# Patient Record
Sex: MALE | Race: WHITE | NOT HISPANIC OR LATINO
[De-identification: names, ages, dates, MRNs, and addresses within clinical notes are randomized per-mention and may not be internally consistent; named-entity substitution may affect disease eponyms.]

---

## 2019-10-16 ENCOUNTER — RESULT REVIEW (OUTPATIENT)
Age: 54
End: 2019-10-16

## 2019-10-16 ENCOUNTER — INPATIENT (INPATIENT)
Facility: HOSPITAL | Age: 54
LOS: 3 days | Discharge: HOME | DRG: 853 | End: 2019-10-20
Attending: SURGERY | Admitting: SURGERY
Payer: COMMERCIAL

## 2019-10-16 VITALS
SYSTOLIC BLOOD PRESSURE: 109 MMHG | OXYGEN SATURATION: 96 % | RESPIRATION RATE: 18 BRPM | HEART RATE: 82 BPM | WEIGHT: 190.04 LBS | DIASTOLIC BLOOD PRESSURE: 71 MMHG | TEMPERATURE: 98 F

## 2019-10-16 LAB
ALBUMIN SERPL ELPH-MCNC: 4.7 G/DL — SIGNIFICANT CHANGE UP (ref 3.3–5)
ALP SERPL-CCNC: 61 U/L — SIGNIFICANT CHANGE UP (ref 40–120)
ALT FLD-CCNC: 26 U/L — SIGNIFICANT CHANGE UP (ref 10–45)
ANION GAP SERPL CALC-SCNC: 19 MMOL/L — HIGH (ref 5–17)
APPEARANCE UR: CLEAR — SIGNIFICANT CHANGE UP
APTT BLD: 21.4 SEC — LOW (ref 27.5–36.3)
AST SERPL-CCNC: 29 U/L — SIGNIFICANT CHANGE UP (ref 10–40)
BASOPHILS # BLD AUTO: 0.03 K/UL — SIGNIFICANT CHANGE UP (ref 0–0.2)
BASOPHILS NFR BLD AUTO: 0.2 % — SIGNIFICANT CHANGE UP (ref 0–2)
BILIRUB SERPL-MCNC: 1.1 MG/DL — SIGNIFICANT CHANGE UP (ref 0.2–1.2)
BILIRUB UR-MCNC: ABNORMAL
BLD GP AB SCN SERPL QL: NEGATIVE — SIGNIFICANT CHANGE UP
BUN SERPL-MCNC: 11 MG/DL — SIGNIFICANT CHANGE UP (ref 7–23)
CALCIUM SERPL-MCNC: 9.9 MG/DL — SIGNIFICANT CHANGE UP (ref 8.4–10.5)
CHLORIDE SERPL-SCNC: 100 MMOL/L — SIGNIFICANT CHANGE UP (ref 96–108)
CO2 SERPL-SCNC: 24 MMOL/L — SIGNIFICANT CHANGE UP (ref 22–31)
COLOR SPEC: YELLOW — SIGNIFICANT CHANGE UP
CREAT SERPL-MCNC: 0.86 MG/DL — SIGNIFICANT CHANGE UP (ref 0.5–1.3)
DIFF PNL FLD: NEGATIVE — SIGNIFICANT CHANGE UP
EOSINOPHIL # BLD AUTO: 0.03 K/UL — SIGNIFICANT CHANGE UP (ref 0–0.5)
EOSINOPHIL NFR BLD AUTO: 0.2 % — SIGNIFICANT CHANGE UP (ref 0–6)
GLUCOSE SERPL-MCNC: 184 MG/DL — HIGH (ref 70–99)
GLUCOSE UR QL: NEGATIVE — SIGNIFICANT CHANGE UP
HCT VFR BLD CALC: 49 % — SIGNIFICANT CHANGE UP (ref 39–50)
HGB BLD-MCNC: 15.8 G/DL — SIGNIFICANT CHANGE UP (ref 13–17)
IMM GRANULOCYTES NFR BLD AUTO: 0.4 % — SIGNIFICANT CHANGE UP (ref 0–1.5)
INR BLD: 1.13 — SIGNIFICANT CHANGE UP (ref 0.88–1.16)
KETONES UR-MCNC: >=80 MG/DL
LEUKOCYTE ESTERASE UR-ACNC: NEGATIVE — SIGNIFICANT CHANGE UP
LIDOCAIN IGE QN: 18 U/L — SIGNIFICANT CHANGE UP (ref 7–60)
LYMPHOCYTES # BLD AUTO: 1.28 K/UL — SIGNIFICANT CHANGE UP (ref 1–3.3)
LYMPHOCYTES # BLD AUTO: 9.4 % — LOW (ref 13–44)
MCHC RBC-ENTMCNC: 28.5 PG — SIGNIFICANT CHANGE UP (ref 27–34)
MCHC RBC-ENTMCNC: 32.2 GM/DL — SIGNIFICANT CHANGE UP (ref 32–36)
MCV RBC AUTO: 88.3 FL — SIGNIFICANT CHANGE UP (ref 80–100)
MONOCYTES # BLD AUTO: 0.78 K/UL — SIGNIFICANT CHANGE UP (ref 0–0.9)
MONOCYTES NFR BLD AUTO: 5.7 % — SIGNIFICANT CHANGE UP (ref 2–14)
NEUTROPHILS # BLD AUTO: 11.45 K/UL — HIGH (ref 1.8–7.4)
NEUTROPHILS NFR BLD AUTO: 84.1 % — HIGH (ref 43–77)
NITRITE UR-MCNC: NEGATIVE — SIGNIFICANT CHANGE UP
NRBC # BLD: 0 /100 WBCS — SIGNIFICANT CHANGE UP (ref 0–0)
PH UR: 6 — SIGNIFICANT CHANGE UP (ref 5–8)
PLATELET # BLD AUTO: 304 K/UL — SIGNIFICANT CHANGE UP (ref 150–400)
POTASSIUM SERPL-MCNC: 4.6 MMOL/L — SIGNIFICANT CHANGE UP (ref 3.5–5.3)
POTASSIUM SERPL-SCNC: 4.6 MMOL/L — SIGNIFICANT CHANGE UP (ref 3.5–5.3)
PROT SERPL-MCNC: 8.5 G/DL — HIGH (ref 6–8.3)
PROT UR-MCNC: NEGATIVE MG/DL — SIGNIFICANT CHANGE UP
PROTHROM AB SERPL-ACNC: 12.8 SEC — SIGNIFICANT CHANGE UP (ref 10–12.9)
RBC # BLD: 5.55 M/UL — SIGNIFICANT CHANGE UP (ref 4.2–5.8)
RBC # FLD: 13.1 % — SIGNIFICANT CHANGE UP (ref 10.3–14.5)
RH IG SCN BLD-IMP: POSITIVE — SIGNIFICANT CHANGE UP
RH IG SCN BLD-IMP: POSITIVE — SIGNIFICANT CHANGE UP
SODIUM SERPL-SCNC: 143 MMOL/L — SIGNIFICANT CHANGE UP (ref 135–145)
SP GR SPEC: >=1.03 — SIGNIFICANT CHANGE UP (ref 1–1.03)
UROBILINOGEN FLD QL: 0.2 E.U./DL — SIGNIFICANT CHANGE UP
WBC # BLD: 13.62 K/UL — HIGH (ref 3.8–10.5)
WBC # FLD AUTO: 13.62 K/UL — HIGH (ref 3.8–10.5)

## 2019-10-16 PROCEDURE — 44970 LAPAROSCOPY APPENDECTOMY: CPT

## 2019-10-16 PROCEDURE — 99285 EMERGENCY DEPT VISIT HI MDM: CPT

## 2019-10-16 PROCEDURE — 71045 X-RAY EXAM CHEST 1 VIEW: CPT | Mod: 26

## 2019-10-16 PROCEDURE — 74177 CT ABD & PELVIS W/CONTRAST: CPT | Mod: 26

## 2019-10-16 PROCEDURE — 99232 SBSQ HOSP IP/OBS MODERATE 35: CPT | Mod: 57

## 2019-10-16 RX ORDER — CEFTRIAXONE 500 MG/1
1000 INJECTION, POWDER, FOR SOLUTION INTRAMUSCULAR; INTRAVENOUS ONCE
Refills: 0 | Status: COMPLETED | OUTPATIENT
Start: 2019-10-16 | End: 2019-10-16

## 2019-10-16 RX ORDER — CEFTRIAXONE 500 MG/1
1000 INJECTION, POWDER, FOR SOLUTION INTRAMUSCULAR; INTRAVENOUS EVERY 24 HOURS
Refills: 0 | Status: CANCELLED | OUTPATIENT
Start: 2019-10-17 | End: 2019-10-17

## 2019-10-16 RX ORDER — ONDANSETRON 8 MG/1
4 TABLET, FILM COATED ORAL EVERY 6 HOURS
Refills: 0 | Status: DISCONTINUED | OUTPATIENT
Start: 2019-10-16 | End: 2019-10-17

## 2019-10-16 RX ORDER — SODIUM CHLORIDE 9 MG/ML
1000 INJECTION INTRAMUSCULAR; INTRAVENOUS; SUBCUTANEOUS
Refills: 0 | Status: DISCONTINUED | OUTPATIENT
Start: 2019-10-16 | End: 2019-10-17

## 2019-10-16 RX ORDER — MORPHINE SULFATE 50 MG/1
4 CAPSULE, EXTENDED RELEASE ORAL ONCE
Refills: 0 | Status: DISCONTINUED | OUTPATIENT
Start: 2019-10-16 | End: 2019-10-16

## 2019-10-16 RX ORDER — METRONIDAZOLE 500 MG
500 TABLET ORAL ONCE
Refills: 0 | Status: COMPLETED | OUTPATIENT
Start: 2019-10-16 | End: 2019-10-16

## 2019-10-16 RX ORDER — HYDROMORPHONE HYDROCHLORIDE 2 MG/ML
0.5 INJECTION INTRAMUSCULAR; INTRAVENOUS; SUBCUTANEOUS EVERY 6 HOURS
Refills: 0 | Status: DISCONTINUED | OUTPATIENT
Start: 2019-10-16 | End: 2019-10-17

## 2019-10-16 RX ORDER — ACETAMINOPHEN 500 MG
975 TABLET ORAL ONCE
Refills: 0 | Status: COMPLETED | OUTPATIENT
Start: 2019-10-16 | End: 2019-10-16

## 2019-10-16 RX ORDER — HYDROMORPHONE HYDROCHLORIDE 2 MG/ML
0.5 INJECTION INTRAMUSCULAR; INTRAVENOUS; SUBCUTANEOUS ONCE
Refills: 0 | Status: DISCONTINUED | OUTPATIENT
Start: 2019-10-16 | End: 2019-10-16

## 2019-10-16 RX ORDER — BUPIVACAINE 13.3 MG/ML
20 INJECTION, SUSPENSION, LIPOSOMAL INFILTRATION ONCE
Refills: 0 | Status: DISCONTINUED | OUTPATIENT
Start: 2019-10-16 | End: 2019-10-17

## 2019-10-16 RX ORDER — METRONIDAZOLE 500 MG
500 TABLET ORAL EVERY 8 HOURS
Refills: 0 | Status: CANCELLED | OUTPATIENT
Start: 2019-10-17 | End: 2019-10-17

## 2019-10-16 RX ORDER — SODIUM CHLORIDE 9 MG/ML
1000 INJECTION INTRAMUSCULAR; INTRAVENOUS; SUBCUTANEOUS ONCE
Refills: 0 | Status: COMPLETED | OUTPATIENT
Start: 2019-10-16 | End: 2019-10-16

## 2019-10-16 RX ORDER — IOHEXOL 300 MG/ML
30 INJECTION, SOLUTION INTRAVENOUS ONCE
Refills: 0 | Status: COMPLETED | OUTPATIENT
Start: 2019-10-16 | End: 2019-10-16

## 2019-10-16 RX ORDER — HYDROMORPHONE HYDROCHLORIDE 2 MG/ML
1 INJECTION INTRAMUSCULAR; INTRAVENOUS; SUBCUTANEOUS EVERY 6 HOURS
Refills: 0 | Status: DISCONTINUED | OUTPATIENT
Start: 2019-10-16 | End: 2019-10-17

## 2019-10-16 RX ORDER — HEPARIN SODIUM 5000 [USP'U]/ML
5000 INJECTION INTRAVENOUS; SUBCUTANEOUS EVERY 8 HOURS
Refills: 0 | Status: DISCONTINUED | OUTPATIENT
Start: 2019-10-16 | End: 2019-10-17

## 2019-10-16 RX ORDER — ONDANSETRON 8 MG/1
4 TABLET, FILM COATED ORAL ONCE
Refills: 0 | Status: COMPLETED | OUTPATIENT
Start: 2019-10-16 | End: 2019-10-16

## 2019-10-16 RX ADMIN — MORPHINE SULFATE 4 MILLIGRAM(S): 50 CAPSULE, EXTENDED RELEASE ORAL at 17:01

## 2019-10-16 RX ADMIN — SODIUM CHLORIDE 1000 MILLILITER(S): 9 INJECTION INTRAMUSCULAR; INTRAVENOUS; SUBCUTANEOUS at 16:15

## 2019-10-16 RX ADMIN — Medication 975 MILLIGRAM(S): at 19:44

## 2019-10-16 RX ADMIN — SODIUM CHLORIDE 1000 MILLILITER(S): 9 INJECTION INTRAMUSCULAR; INTRAVENOUS; SUBCUTANEOUS at 00:00

## 2019-10-16 RX ADMIN — CEFTRIAXONE 100 MILLIGRAM(S): 500 INJECTION, POWDER, FOR SOLUTION INTRAMUSCULAR; INTRAVENOUS at 19:11

## 2019-10-16 RX ADMIN — HYDROMORPHONE HYDROCHLORIDE 0.5 MILLIGRAM(S): 2 INJECTION INTRAMUSCULAR; INTRAVENOUS; SUBCUTANEOUS at 17:12

## 2019-10-16 RX ADMIN — MORPHINE SULFATE 4 MILLIGRAM(S): 50 CAPSULE, EXTENDED RELEASE ORAL at 16:07

## 2019-10-16 RX ADMIN — HYDROMORPHONE HYDROCHLORIDE 0.5 MILLIGRAM(S): 2 INJECTION INTRAMUSCULAR; INTRAVENOUS; SUBCUTANEOUS at 18:44

## 2019-10-16 RX ADMIN — IOHEXOL 30 MILLILITER(S): 300 INJECTION, SOLUTION INTRAVENOUS at 16:15

## 2019-10-16 RX ADMIN — Medication 975 MILLIGRAM(S): at 18:41

## 2019-10-16 RX ADMIN — Medication 100 MILLIGRAM(S): at 19:44

## 2019-10-16 RX ADMIN — SODIUM CHLORIDE 1000 MILLILITER(S): 9 INJECTION INTRAMUSCULAR; INTRAVENOUS; SUBCUTANEOUS at 19:10

## 2019-10-16 RX ADMIN — ONDANSETRON 4 MILLIGRAM(S): 8 TABLET, FILM COATED ORAL at 16:00

## 2019-10-16 NOTE — ED ADULT NURSE NOTE - OBJECTIVE STATEMENT
pt c/o severe abdominal pain with nausea and vomiting x1 that started today. Pt describes pain as cramping

## 2019-10-16 NOTE — H&P ADULT - HISTORY OF PRESENT ILLNESS
53M no PMH/PSH presents with 12 hours of RLQ pain. The pain is sharp and constant. He has had nausea with 1 episode of NB/NB emesis. He has had fevers and chills. He is passing flatus and brown bowel movements today. He denies any prior episodes, sick contacts, recent travel, colonoscopy, EGD, or prior abdominal surgeries.

## 2019-10-16 NOTE — ED ADULT NURSE NOTE - NSIMPLEMENTINTERV_GEN_ALL_ED
Implemented All Universal Safety Interventions:  [place] to call system. Call bell, personal items and telephone within reach. Instruct patient to call for assistance. Room bathroom lighting operational. Non-slip footwear when patient is off stretcher. Physically safe environment: no spills, clutter or unnecessary equipment. Stretcher in lowest position, wheels locked, appropriate side rails in place.

## 2019-10-16 NOTE — H&P ADULT - ATTENDING COMMENTS
Patient seen and examined. His history, physical examination and imaging are consistent with acute appendicitis. We discussed treatment options. We discussed the risks, benefits and alternatives to laparoscopic appendectomy with the patient including but not limited to bleeding, infection, death, disability, chronic pain, numbness, abscess, leak, dvt, cardiac and pulmonary issues, need for additional procedures, and other issues. The patient does wish to proceed with surgery. I answered all questions.

## 2019-10-16 NOTE — H&P ADULT - NSHPLABSRESULTS_GEN_ALL_CORE
Vital Signs:  Vital Signs Last 24 Hrs  T(C): 38.1 (16 Oct 2019 19:00), Max: 38.1 (16 Oct 2019 17:25)  T(F): 100.6 (16 Oct 2019 19:00), Max: 100.6 (16 Oct 2019 19:00)  HR: 94 (16 Oct 2019 19:00) (82 - 97)  BP: 169/82 (16 Oct 2019 19:00) (109/71 - 169/82)  BP(mean): --  RR: 18 (16 Oct 2019 19:00) (18 - 18)  SpO2: 95% (16 Oct 2019 19:00) (95% - 96%)        Labs:                        15.8   13.62 )-----------( 304      ( 16 Oct 2019 15:47 )             49.0     10-16    143  |  100  |  11  ----------------------------<  184<H>  4.6   |  24  |  0.86    Ca    9.9      16 Oct 2019 15:47    TPro  8.5<H>  /  Alb  4.7  /  TBili  1.1  /  DBili  x   /  AST  29  /  ALT  26  /  AlkPhos  61  10-16    PT/INR - ( 16 Oct 2019 19:08 )   PT: 12.8 sec;   INR: 1.13          PTT - ( 16 Oct 2019 19:08 )  PTT:21.4 sec  Urinalysis Basic - ( 16 Oct 2019 17:48 )    Color: Yellow / Appearance: Clear / SG: >=1.030 / pH: x  Gluc: x / Ketone: >=80 mg/dL  / Bili: Moderate / Urobili: 0.2 E.U./dL   Blood: x / Protein: NEGATIVE mg/dL / Nitrite: NEGATIVE   Leuk Esterase: NEGATIVE / RBC: x / WBC x   Sq Epi: x / Non Sq Epi: x / Bacteria: x      CAPILLARY BLOOD GLUCOSE        LIVER FUNCTIONS - ( 16 Oct 2019 15:47 )  Alb: 4.7 g/dL / Pro: 8.5 g/dL / ALK PHOS: 61 U/L / ALT: 26 U/L / AST: 29 U/L / GGT: x    CT A/P W/ PO AND IV CONTRAST  Lower chest: Trace right pleural effusion with passive atelectasis in the   right lower lobe.     Liver: Subcentimeter hypodensity in the hepatic dome, too small to   characterize..     Biliary system: Normal.     Pancreas: Normal.    Spleen: Normal.    Adrenal glands: Normal    Kidneys: Symmetric renal enhancement. Subcentimeter hypodensity in the   right upper pole, too small to characterize. No hydronephrosis..     Bowel/Peritoneum: Dilated appendix with peripheral fat stranding and   linear nondrainable fluid.. Suggestion of a 1.5 x 1.5 cm fluid collection   with thick enhancing rim near the tip suspicious for an appendiceal   abscess. Small perihepatic and pelvic ascites. No free air. Oral contrast   in distal esophagus suggests gastroesophageal reflux. Small   supraumbilical hernia containing fat and a blood vessel.    Pelvic organs: Enlarged prostate. The bladder appears normal.    Lymph nodes: Normal.    Vascular: Normal.    Bones/Soft tissues: Degenerative changes of the spine and hips. Tiny   fat-containing right inguinal hernia.      IMPRESSION:   1.  Acute appendicitis. Possible 1.5 cm appendiceal abscess near the tip.   No free air. Small abdominal and pelvic ascites.    2.  Small supraumbilical hernia, as above.    3.  Enlarged prostate

## 2019-10-16 NOTE — ED PROVIDER NOTE - OBJECTIVE STATEMENT
52 y/o m no pmh presents c/o right side abd pain which began this morning, gradually worsening throughout the day.  Pt reports nausea, vomited once since arriving to ED.  Denies fever, chills, diarrhea, dysuria, all other ROS negative.

## 2019-10-16 NOTE — ED PROVIDER NOTE - ATTENDING CONTRIBUTION TO CARE
54 yo with no medical problems, rt sided abd pain w nausea, tender to RLQ, no diarrhea, fever in ER  highest suspician for ramsey, ERI neg,   pending CT study

## 2019-10-16 NOTE — H&P ADULT - ASSESSMENT
A/P: 53M no PMH/PSH presents with acute appendicitis.    - Admit to regional bed under Dr. Mclaughlin  - Pain/nausea PRN  - NPO/IVF  - Ceftriaxone/Flagyl  - OOBA/SCDs/SQH  - Preoperative labs, EKG, CXR  - Add on appendectomy  - Case discussed with chief resident and Dr. Mclaughlin

## 2019-10-16 NOTE — H&P ADULT - NSHPPHYSICALEXAM_GEN_ALL_CORE
Physical Exam:  General: NAD  Pulmonary: Nonlabored breathing, no respiratory distress  Abdominal: Soft, RLQ tenderness to light palpation, no rebound tenderness, slightly distended  Neuro: AAO x3, no focal deficits

## 2019-10-16 NOTE — ED PROVIDER NOTE - CLINICAL SUMMARY MEDICAL DECISION MAKING FREE TEXT BOX
54 y/o m no pmh presents c/o RLQ pain today; initially pt afebrile, temp on repeat is 100.5, tender on exam concerning for appendicitis.  WBC 13, urine neg, given zofran, pain meds, IV fluid.  Will f/u CT

## 2019-10-17 DIAGNOSIS — Z98.890 OTHER SPECIFIED POSTPROCEDURAL STATES: ICD-10-CM

## 2019-10-17 DIAGNOSIS — K35.80 UNSPECIFIED ACUTE APPENDICITIS: ICD-10-CM

## 2019-10-17 LAB
ANION GAP SERPL CALC-SCNC: 10 MMOL/L — SIGNIFICANT CHANGE UP (ref 5–17)
BUN SERPL-MCNC: 12 MG/DL — SIGNIFICANT CHANGE UP (ref 7–23)
CALCIUM SERPL-MCNC: 8.6 MG/DL — SIGNIFICANT CHANGE UP (ref 8.4–10.5)
CHLORIDE SERPL-SCNC: 103 MMOL/L — SIGNIFICANT CHANGE UP (ref 96–108)
CO2 SERPL-SCNC: 22 MMOL/L — SIGNIFICANT CHANGE UP (ref 22–31)
CREAT SERPL-MCNC: 0.76 MG/DL — SIGNIFICANT CHANGE UP (ref 0.5–1.3)
GLUCOSE SERPL-MCNC: 199 MG/DL — HIGH (ref 70–99)
HCT VFR BLD CALC: 41.7 % — SIGNIFICANT CHANGE UP (ref 39–50)
HGB BLD-MCNC: 13.2 G/DL — SIGNIFICANT CHANGE UP (ref 13–17)
MAGNESIUM SERPL-MCNC: 1.7 MG/DL — SIGNIFICANT CHANGE UP (ref 1.6–2.6)
MCHC RBC-ENTMCNC: 28.7 PG — SIGNIFICANT CHANGE UP (ref 27–34)
MCHC RBC-ENTMCNC: 31.7 GM/DL — LOW (ref 32–36)
MCV RBC AUTO: 90.7 FL — SIGNIFICANT CHANGE UP (ref 80–100)
NRBC # BLD: 0 /100 WBCS — SIGNIFICANT CHANGE UP (ref 0–0)
PHOSPHATE SERPL-MCNC: 2.1 MG/DL — LOW (ref 2.5–4.5)
PLATELET # BLD AUTO: 240 K/UL — SIGNIFICANT CHANGE UP (ref 150–400)
POTASSIUM SERPL-MCNC: 4.5 MMOL/L — SIGNIFICANT CHANGE UP (ref 3.5–5.3)
POTASSIUM SERPL-SCNC: 4.5 MMOL/L — SIGNIFICANT CHANGE UP (ref 3.5–5.3)
RBC # BLD: 4.6 M/UL — SIGNIFICANT CHANGE UP (ref 4.2–5.8)
RBC # FLD: 13.1 % — SIGNIFICANT CHANGE UP (ref 10.3–14.5)
SODIUM SERPL-SCNC: 135 MMOL/L — SIGNIFICANT CHANGE UP (ref 135–145)
WBC # BLD: 15.76 K/UL — HIGH (ref 3.8–10.5)
WBC # FLD AUTO: 15.76 K/UL — HIGH (ref 3.8–10.5)

## 2019-10-17 PROCEDURE — 99222 1ST HOSP IP/OBS MODERATE 55: CPT | Mod: GC

## 2019-10-17 RX ORDER — HYDROMORPHONE HYDROCHLORIDE 2 MG/ML
0.5 INJECTION INTRAMUSCULAR; INTRAVENOUS; SUBCUTANEOUS EVERY 4 HOURS
Refills: 0 | Status: DISCONTINUED | OUTPATIENT
Start: 2019-10-17 | End: 2019-10-18

## 2019-10-17 RX ORDER — PIPERACILLIN AND TAZOBACTAM 4; .5 G/20ML; G/20ML
3.38 INJECTION, POWDER, LYOPHILIZED, FOR SOLUTION INTRAVENOUS EVERY 6 HOURS
Refills: 0 | Status: DISCONTINUED | OUTPATIENT
Start: 2019-10-17 | End: 2019-10-20

## 2019-10-17 RX ORDER — SODIUM CHLORIDE 9 MG/ML
1000 INJECTION, SOLUTION INTRAVENOUS
Refills: 0 | Status: DISCONTINUED | OUTPATIENT
Start: 2019-10-17 | End: 2019-10-17

## 2019-10-17 RX ORDER — SODIUM CHLORIDE 9 MG/ML
1000 INJECTION, SOLUTION INTRAVENOUS
Refills: 0 | Status: DISCONTINUED | OUTPATIENT
Start: 2019-10-17 | End: 2019-10-18

## 2019-10-17 RX ORDER — HEPARIN SODIUM 5000 [USP'U]/ML
5000 INJECTION INTRAVENOUS; SUBCUTANEOUS EVERY 8 HOURS
Refills: 0 | Status: DISCONTINUED | OUTPATIENT
Start: 2019-10-17 | End: 2019-10-20

## 2019-10-17 RX ORDER — MAGNESIUM SULFATE 500 MG/ML
1 VIAL (ML) INJECTION ONCE
Refills: 0 | Status: COMPLETED | OUTPATIENT
Start: 2019-10-17 | End: 2019-10-17

## 2019-10-17 RX ORDER — ONDANSETRON 8 MG/1
4 TABLET, FILM COATED ORAL EVERY 6 HOURS
Refills: 0 | Status: DISCONTINUED | OUTPATIENT
Start: 2019-10-17 | End: 2019-10-20

## 2019-10-17 RX ORDER — HYDRALAZINE HCL 50 MG
10 TABLET ORAL ONCE
Refills: 0 | Status: COMPLETED | OUTPATIENT
Start: 2019-10-17 | End: 2019-10-17

## 2019-10-17 RX ORDER — INFLUENZA VIRUS VACCINE 15; 15; 15; 15 UG/.5ML; UG/.5ML; UG/.5ML; UG/.5ML
0.5 SUSPENSION INTRAMUSCULAR ONCE
Refills: 0 | Status: DISCONTINUED | OUTPATIENT
Start: 2019-10-17 | End: 2019-10-20

## 2019-10-17 RX ORDER — HYDROMORPHONE HYDROCHLORIDE 2 MG/ML
1 INJECTION INTRAMUSCULAR; INTRAVENOUS; SUBCUTANEOUS EVERY 4 HOURS
Refills: 0 | Status: DISCONTINUED | OUTPATIENT
Start: 2019-10-17 | End: 2019-10-18

## 2019-10-17 RX ADMIN — Medication 1 MILLIGRAM(S): at 21:21

## 2019-10-17 RX ADMIN — Medication 100 GRAM(S): at 14:45

## 2019-10-17 RX ADMIN — PIPERACILLIN AND TAZOBACTAM 200 GRAM(S): 4; .5 INJECTION, POWDER, LYOPHILIZED, FOR SOLUTION INTRAVENOUS at 07:27

## 2019-10-17 RX ADMIN — SODIUM CHLORIDE 110 MILLILITER(S): 9 INJECTION, SOLUTION INTRAVENOUS at 12:40

## 2019-10-17 RX ADMIN — HEPARIN SODIUM 5000 UNIT(S): 5000 INJECTION INTRAVENOUS; SUBCUTANEOUS at 17:44

## 2019-10-17 RX ADMIN — PIPERACILLIN AND TAZOBACTAM 200 GRAM(S): 4; .5 INJECTION, POWDER, LYOPHILIZED, FOR SOLUTION INTRAVENOUS at 17:44

## 2019-10-17 RX ADMIN — SODIUM CHLORIDE 125 MILLILITER(S): 9 INJECTION, SOLUTION INTRAVENOUS at 03:00

## 2019-10-17 RX ADMIN — HEPARIN SODIUM 5000 UNIT(S): 5000 INJECTION INTRAVENOUS; SUBCUTANEOUS at 09:42

## 2019-10-17 RX ADMIN — PIPERACILLIN AND TAZOBACTAM 200 GRAM(S): 4; .5 INJECTION, POWDER, LYOPHILIZED, FOR SOLUTION INTRAVENOUS at 23:00

## 2019-10-17 RX ADMIN — Medication 10 MILLIGRAM(S): at 22:59

## 2019-10-17 RX ADMIN — Medication 62.5 MILLIMOLE(S): at 14:45

## 2019-10-17 RX ADMIN — PIPERACILLIN AND TAZOBACTAM 200 GRAM(S): 4; .5 INJECTION, POWDER, LYOPHILIZED, FOR SOLUTION INTRAVENOUS at 02:14

## 2019-10-17 NOTE — CONSULT NOTE ADULT - SUBJECTIVE AND OBJECTIVE BOX
INTERVAL HPI/OVERNIGHT EVENTS:  Interim reviewed and patient examined;  Post op appendectomy. No other significant medical problems; No Cardio or pulmonary  disease;       MEDICATIONS  (STANDING):  heparin  Injectable 5000 Unit(s) SubCutaneous every 8 hours  influenza   Vaccine 0.5 milliLiter(s) IntraMuscular once  piperacillin/tazobactam IVPB.. 3.375 Gram(s) IV Intermittent every 6 hours  sodium chloride 0.9% 1000 milliLiter(s) (125 mL/Hr) IV Continuous <Continuous>    MEDICATIONS  (PRN):  HYDROmorphone  Injectable 0.5 milliGRAM(s) IV Push every 4 hours PRN Moderate Pain (4 - 6)  HYDROmorphone  Injectable 1 milliGRAM(s) IV Push every 4 hours PRN Severe Pain (7 - 10)  ondansetron Injectable 4 milliGRAM(s) IV Push every 6 hours PRN Nausea      Allergies    No Known Allergies    Intolerances        Vital Signs Last 24 Hrs  T(C): 36.8 (17 Oct 2019 10:00), Max: 38.3 (16 Oct 2019 21:09)  T(F): 98.3 (17 Oct 2019 10:00), Max: 101 (16 Oct 2019 21:09)  HR: 78 (17 Oct 2019 08:37) (18 - 97)  BP: 127/84 (17 Oct 2019 08:37) (109/71 - 169/82)  BP(mean): 101 (17 Oct 2019 08:37) (78 - 101)  RR: 19 (17 Oct 2019 08:37) (15 - 30)  SpO2: 98% (17 Oct 2019 08:37) (95% - 98%)          Constitutional:  Awake    Eyes: LOUISE    ENMT: Negative    Neck: Supple    Back:  no tenderness     Respiratory:  clear    Cardiovascular: S1 S2    Gastrointestinal: soft slight distension   HARITHA inplace    Genitourinary:    Extremities:  no edema    Vascular:    Neurological:    Skin:    Lymph Nodes:            10-16 @ 07:01  -  10-17 @ 07:00  --------------------------------------------------------  IN: 975 mL / OUT: 390 mL / NET: 585 mL      LABS:                        15.8   13.62 )-----------( 304      ( 16 Oct 2019 15:47 )             49.0     10-16    143  |  100  |  11  ----------------------------<  184<H>  4.6   |  24  |  0.86    Ca    9.9      16 Oct 2019 15:47    TPro  8.5<H>  /  Alb  4.7  /  TBili  1.1  /  DBili  x   /  AST  29  /  ALT  26  /  AlkPhos  61  10-16    PT/INR - ( 16 Oct 2019 19:08 )   PT: 12.8 sec;   INR: 1.13          PTT - ( 16 Oct 2019 19:08 )  PTT:21.4 sec  Urinalysis Basic - ( 16 Oct 2019 17:48 )    Color: Yellow / Appearance: Clear / SG: >=1.030 / pH: x  Gluc: x / Ketone: >=80 mg/dL  / Bili: Moderate / Urobili: 0.2 E.U./dL   Blood: x / Protein: NEGATIVE mg/dL / Nitrite: NEGATIVE   Leuk Esterase: NEGATIVE / RBC: x / WBC x   Sq Epi: x / Non Sq Epi: x / Bacteria: x        RADIOLOGY & ADDITIONAL TESTS:

## 2019-10-17 NOTE — PROGRESS NOTE ADULT - ASSESSMENT
52 yo alcoholic POD 0 s/p lap appy and washout of gangrenous and perforated appendix  Will consider advancing diet  Continue Abx  CIWA for alcohol withdrawl

## 2019-10-17 NOTE — BRIEF OPERATIVE NOTE - OPERATION/FINDINGS
Preop Dx: Acute Appendicitis  Postop Dx: Acute perforated appendicitis  Procedure: Laparoscopic Appendectomy  Findings: Abdomen accessed via open cutdown. Generalized peritonitis noted. Pus seen in pelvis and along right paracolic gutter. Retrocecal gangrenous, perforated appendix with associated abscess cavity noted. Mesoappendix taken with harmonic scalpel. Appendix taken with 45mm purple load EndoGIA stapler. Staple line hemostatic. Pelvis, Right paracolic gutter, and perihepatic spaces irrigated thoroughly. 19Fr Drain left in right paracolic gutter and parahepatic space.

## 2019-10-18 DIAGNOSIS — F41.9 ANXIETY DISORDER, UNSPECIFIED: ICD-10-CM

## 2019-10-18 LAB
ANION GAP SERPL CALC-SCNC: 10 MMOL/L — SIGNIFICANT CHANGE UP (ref 5–17)
BUN SERPL-MCNC: 14 MG/DL — SIGNIFICANT CHANGE UP (ref 7–23)
CALCIUM SERPL-MCNC: 8.7 MG/DL — SIGNIFICANT CHANGE UP (ref 8.4–10.5)
CHLORIDE SERPL-SCNC: 99 MMOL/L — SIGNIFICANT CHANGE UP (ref 96–108)
CO2 SERPL-SCNC: 25 MMOL/L — SIGNIFICANT CHANGE UP (ref 22–31)
CREAT SERPL-MCNC: 0.86 MG/DL — SIGNIFICANT CHANGE UP (ref 0.5–1.3)
GLUCOSE SERPL-MCNC: 128 MG/DL — HIGH (ref 70–99)
HCT VFR BLD CALC: 44.6 % — SIGNIFICANT CHANGE UP (ref 39–50)
HGB BLD-MCNC: 14.1 G/DL — SIGNIFICANT CHANGE UP (ref 13–17)
MAGNESIUM SERPL-MCNC: 2 MG/DL — SIGNIFICANT CHANGE UP (ref 1.6–2.6)
MCHC RBC-ENTMCNC: 27.9 PG — SIGNIFICANT CHANGE UP (ref 27–34)
MCHC RBC-ENTMCNC: 31.6 GM/DL — LOW (ref 32–36)
MCV RBC AUTO: 88.3 FL — SIGNIFICANT CHANGE UP (ref 80–100)
NRBC # BLD: 0 /100 WBCS — SIGNIFICANT CHANGE UP (ref 0–0)
PHOSPHATE SERPL-MCNC: 2.5 MG/DL — SIGNIFICANT CHANGE UP (ref 2.5–4.5)
PLATELET # BLD AUTO: 259 K/UL — SIGNIFICANT CHANGE UP (ref 150–400)
POTASSIUM SERPL-MCNC: 3.9 MMOL/L — SIGNIFICANT CHANGE UP (ref 3.5–5.3)
POTASSIUM SERPL-SCNC: 3.9 MMOL/L — SIGNIFICANT CHANGE UP (ref 3.5–5.3)
RBC # BLD: 5.05 M/UL — SIGNIFICANT CHANGE UP (ref 4.2–5.8)
RBC # FLD: 12.9 % — SIGNIFICANT CHANGE UP (ref 10.3–14.5)
SODIUM SERPL-SCNC: 134 MMOL/L — LOW (ref 135–145)
WBC # BLD: 13.88 K/UL — HIGH (ref 3.8–10.5)
WBC # FLD AUTO: 13.88 K/UL — HIGH (ref 3.8–10.5)

## 2019-10-18 PROCEDURE — 99233 SBSQ HOSP IP/OBS HIGH 50: CPT | Mod: GC

## 2019-10-18 RX ORDER — HYDROMORPHONE HYDROCHLORIDE 2 MG/ML
0.3 INJECTION INTRAMUSCULAR; INTRAVENOUS; SUBCUTANEOUS EVERY 4 HOURS
Refills: 0 | Status: DISCONTINUED | OUTPATIENT
Start: 2019-10-18 | End: 2019-10-20

## 2019-10-18 RX ORDER — LANOLIN ALCOHOL/MO/W.PET/CERES
5 CREAM (GRAM) TOPICAL AT BEDTIME
Refills: 0 | Status: DISCONTINUED | OUTPATIENT
Start: 2019-10-18 | End: 2019-10-20

## 2019-10-18 RX ORDER — ACETAMINOPHEN 500 MG
1000 TABLET ORAL ONCE
Refills: 0 | Status: COMPLETED | OUTPATIENT
Start: 2019-10-18 | End: 2019-10-18

## 2019-10-18 RX ORDER — DEXTROSE MONOHYDRATE, SODIUM CHLORIDE, AND POTASSIUM CHLORIDE 50; .745; 4.5 G/1000ML; G/1000ML; G/1000ML
1000 INJECTION, SOLUTION INTRAVENOUS
Refills: 0 | Status: DISCONTINUED | OUTPATIENT
Start: 2019-10-18 | End: 2019-10-20

## 2019-10-18 RX ORDER — POTASSIUM CHLORIDE 20 MEQ
20 PACKET (EA) ORAL ONCE
Refills: 0 | Status: COMPLETED | OUTPATIENT
Start: 2019-10-18 | End: 2019-10-18

## 2019-10-18 RX ORDER — HYDROMORPHONE HYDROCHLORIDE 2 MG/ML
0.5 INJECTION INTRAMUSCULAR; INTRAVENOUS; SUBCUTANEOUS EVERY 4 HOURS
Refills: 0 | Status: DISCONTINUED | OUTPATIENT
Start: 2019-10-18 | End: 2019-10-20

## 2019-10-18 RX ADMIN — Medication 20 MILLIEQUIVALENT(S): at 15:35

## 2019-10-18 RX ADMIN — Medication 5 MILLIGRAM(S): at 00:45

## 2019-10-18 RX ADMIN — Medication 400 MILLIGRAM(S): at 03:02

## 2019-10-18 RX ADMIN — SODIUM CHLORIDE 110 MILLILITER(S): 9 INJECTION, SOLUTION INTRAVENOUS at 00:04

## 2019-10-18 RX ADMIN — Medication 5 MILLIGRAM(S): at 23:40

## 2019-10-18 RX ADMIN — HEPARIN SODIUM 5000 UNIT(S): 5000 INJECTION INTRAVENOUS; SUBCUTANEOUS at 09:48

## 2019-10-18 RX ADMIN — PIPERACILLIN AND TAZOBACTAM 200 GRAM(S): 4; .5 INJECTION, POWDER, LYOPHILIZED, FOR SOLUTION INTRAVENOUS at 09:43

## 2019-10-18 RX ADMIN — HEPARIN SODIUM 5000 UNIT(S): 5000 INJECTION INTRAVENOUS; SUBCUTANEOUS at 22:31

## 2019-10-18 RX ADMIN — Medication 1000 MILLIGRAM(S): at 05:24

## 2019-10-18 RX ADMIN — PIPERACILLIN AND TAZOBACTAM 200 GRAM(S): 4; .5 INJECTION, POWDER, LYOPHILIZED, FOR SOLUTION INTRAVENOUS at 04:12

## 2019-10-18 RX ADMIN — HEPARIN SODIUM 5000 UNIT(S): 5000 INJECTION INTRAVENOUS; SUBCUTANEOUS at 01:14

## 2019-10-18 RX ADMIN — PIPERACILLIN AND TAZOBACTAM 200 GRAM(S): 4; .5 INJECTION, POWDER, LYOPHILIZED, FOR SOLUTION INTRAVENOUS at 16:30

## 2019-10-18 RX ADMIN — DEXTROSE MONOHYDRATE, SODIUM CHLORIDE, AND POTASSIUM CHLORIDE 100 MILLILITER(S): 50; .745; 4.5 INJECTION, SOLUTION INTRAVENOUS at 22:31

## 2019-10-18 RX ADMIN — PIPERACILLIN AND TAZOBACTAM 200 GRAM(S): 4; .5 INJECTION, POWDER, LYOPHILIZED, FOR SOLUTION INTRAVENOUS at 22:31

## 2019-10-18 NOTE — SBIRT NOTE ADULT - NSSBIRTUNABLESCROTHER_GEN_A_CORE
attempted to complete SBIRT with patient. Patient was asleep and could not be aroused.  attempted to complete SBIRT with patient. Patient stated that he has 4-5 drinks daily but refused to answer any further questions.

## 2019-10-18 NOTE — PROGRESS NOTE ADULT - ASSESSMENT
53M hx of alcoholism no sxh presents with acute appendicitis.    - Pain/nausea PRN  - CLD/IVF  - Ceftriaxone/Flagyl  - OOBA/SCDs/SQH  - AM labs  - CIWA protocol

## 2019-10-19 LAB
ANION GAP SERPL CALC-SCNC: 9 MMOL/L — SIGNIFICANT CHANGE UP (ref 5–17)
BUN SERPL-MCNC: 11 MG/DL — SIGNIFICANT CHANGE UP (ref 7–23)
CALCIUM SERPL-MCNC: 8.6 MG/DL — SIGNIFICANT CHANGE UP (ref 8.4–10.5)
CHLORIDE SERPL-SCNC: 103 MMOL/L — SIGNIFICANT CHANGE UP (ref 96–108)
CO2 SERPL-SCNC: 22 MMOL/L — SIGNIFICANT CHANGE UP (ref 22–31)
CREAT SERPL-MCNC: 0.73 MG/DL — SIGNIFICANT CHANGE UP (ref 0.5–1.3)
GLUCOSE SERPL-MCNC: 135 MG/DL — HIGH (ref 70–99)
HCT VFR BLD CALC: 42.7 % — SIGNIFICANT CHANGE UP (ref 39–50)
HGB BLD-MCNC: 14.1 G/DL — SIGNIFICANT CHANGE UP (ref 13–17)
MAGNESIUM SERPL-MCNC: 1.8 MG/DL — SIGNIFICANT CHANGE UP (ref 1.6–2.6)
MCHC RBC-ENTMCNC: 28.7 PG — SIGNIFICANT CHANGE UP (ref 27–34)
MCHC RBC-ENTMCNC: 33 GM/DL — SIGNIFICANT CHANGE UP (ref 32–36)
MCV RBC AUTO: 86.8 FL — SIGNIFICANT CHANGE UP (ref 80–100)
NRBC # BLD: 0 /100 WBCS — SIGNIFICANT CHANGE UP (ref 0–0)
PHOSPHATE SERPL-MCNC: 2.2 MG/DL — LOW (ref 2.5–4.5)
PLATELET # BLD AUTO: 262 K/UL — SIGNIFICANT CHANGE UP (ref 150–400)
POTASSIUM SERPL-MCNC: 3.9 MMOL/L — SIGNIFICANT CHANGE UP (ref 3.5–5.3)
POTASSIUM SERPL-SCNC: 3.9 MMOL/L — SIGNIFICANT CHANGE UP (ref 3.5–5.3)
RBC # BLD: 4.92 M/UL — SIGNIFICANT CHANGE UP (ref 4.2–5.8)
RBC # FLD: 13 % — SIGNIFICANT CHANGE UP (ref 10.3–14.5)
SODIUM SERPL-SCNC: 134 MMOL/L — LOW (ref 135–145)
WBC # BLD: 11.39 K/UL — HIGH (ref 3.8–10.5)
WBC # FLD AUTO: 11.39 K/UL — HIGH (ref 3.8–10.5)

## 2019-10-19 RX ORDER — HYDRALAZINE HCL 50 MG
5 TABLET ORAL ONCE
Refills: 0 | Status: COMPLETED | OUTPATIENT
Start: 2019-10-19 | End: 2019-10-19

## 2019-10-19 RX ORDER — MAGNESIUM SULFATE 500 MG/ML
1 VIAL (ML) INJECTION ONCE
Refills: 0 | Status: COMPLETED | OUTPATIENT
Start: 2019-10-19 | End: 2019-10-19

## 2019-10-19 RX ORDER — POTASSIUM PHOSPHATE, MONOBASIC POTASSIUM PHOSPHATE, DIBASIC 236; 224 MG/ML; MG/ML
15 INJECTION, SOLUTION INTRAVENOUS ONCE
Refills: 0 | Status: COMPLETED | OUTPATIENT
Start: 2019-10-19 | End: 2019-10-19

## 2019-10-19 RX ADMIN — HEPARIN SODIUM 5000 UNIT(S): 5000 INJECTION INTRAVENOUS; SUBCUTANEOUS at 21:10

## 2019-10-19 RX ADMIN — PIPERACILLIN AND TAZOBACTAM 200 GRAM(S): 4; .5 INJECTION, POWDER, LYOPHILIZED, FOR SOLUTION INTRAVENOUS at 05:51

## 2019-10-19 RX ADMIN — PIPERACILLIN AND TAZOBACTAM 200 GRAM(S): 4; .5 INJECTION, POWDER, LYOPHILIZED, FOR SOLUTION INTRAVENOUS at 11:38

## 2019-10-19 RX ADMIN — Medication 5 MILLIGRAM(S): at 03:30

## 2019-10-19 RX ADMIN — Medication 100 GRAM(S): at 12:38

## 2019-10-19 RX ADMIN — Medication 5 MILLIGRAM(S): at 11:39

## 2019-10-19 RX ADMIN — HEPARIN SODIUM 5000 UNIT(S): 5000 INJECTION INTRAVENOUS; SUBCUTANEOUS at 18:19

## 2019-10-19 RX ADMIN — POTASSIUM PHOSPHATE, MONOBASIC POTASSIUM PHOSPHATE, DIBASIC 63.75 MILLIMOLE(S): 236; 224 INJECTION, SOLUTION INTRAVENOUS at 12:43

## 2019-10-19 RX ADMIN — PIPERACILLIN AND TAZOBACTAM 200 GRAM(S): 4; .5 INJECTION, POWDER, LYOPHILIZED, FOR SOLUTION INTRAVENOUS at 18:19

## 2019-10-19 RX ADMIN — HEPARIN SODIUM 5000 UNIT(S): 5000 INJECTION INTRAVENOUS; SUBCUTANEOUS at 05:51

## 2019-10-19 RX ADMIN — PIPERACILLIN AND TAZOBACTAM 200 GRAM(S): 4; .5 INJECTION, POWDER, LYOPHILIZED, FOR SOLUTION INTRAVENOUS at 21:10

## 2019-10-19 NOTE — PROGRESS NOTE ADULT - ASSESSMENT
53M no PMH/PSH presents with acute appendicitis.    - Pain/nausea PRN  - CLD/IVF  - Ceftriaxone/Flagyl  - OOBA/SCDs/SQH  - AM labs

## 2019-10-20 ENCOUNTER — TRANSCRIPTION ENCOUNTER (OUTPATIENT)
Age: 54
End: 2019-10-20

## 2019-10-20 VITALS
RESPIRATION RATE: 33 BRPM | DIASTOLIC BLOOD PRESSURE: 95 MMHG | HEART RATE: 76 BPM | SYSTOLIC BLOOD PRESSURE: 165 MMHG | OXYGEN SATURATION: 94 %

## 2019-10-20 LAB
ANION GAP SERPL CALC-SCNC: 11 MMOL/L — SIGNIFICANT CHANGE UP (ref 5–17)
BUN SERPL-MCNC: 11 MG/DL — SIGNIFICANT CHANGE UP (ref 7–23)
CALCIUM SERPL-MCNC: 8.8 MG/DL — SIGNIFICANT CHANGE UP (ref 8.4–10.5)
CHLORIDE SERPL-SCNC: 103 MMOL/L — SIGNIFICANT CHANGE UP (ref 96–108)
CO2 SERPL-SCNC: 22 MMOL/L — SIGNIFICANT CHANGE UP (ref 22–31)
CREAT SERPL-MCNC: 0.87 MG/DL — SIGNIFICANT CHANGE UP (ref 0.5–1.3)
GLUCOSE SERPL-MCNC: 114 MG/DL — HIGH (ref 70–99)
HCT VFR BLD CALC: 43.4 % — SIGNIFICANT CHANGE UP (ref 39–50)
HGB BLD-MCNC: 13.8 G/DL — SIGNIFICANT CHANGE UP (ref 13–17)
MAGNESIUM SERPL-MCNC: 2 MG/DL — SIGNIFICANT CHANGE UP (ref 1.6–2.6)
MCHC RBC-ENTMCNC: 28 PG — SIGNIFICANT CHANGE UP (ref 27–34)
MCHC RBC-ENTMCNC: 31.8 GM/DL — LOW (ref 32–36)
MCV RBC AUTO: 88 FL — SIGNIFICANT CHANGE UP (ref 80–100)
NRBC # BLD: 0 /100 WBCS — SIGNIFICANT CHANGE UP (ref 0–0)
PHOSPHATE SERPL-MCNC: 3.8 MG/DL — SIGNIFICANT CHANGE UP (ref 2.5–4.5)
PLATELET # BLD AUTO: 312 K/UL — SIGNIFICANT CHANGE UP (ref 150–400)
POTASSIUM SERPL-MCNC: 3.9 MMOL/L — SIGNIFICANT CHANGE UP (ref 3.5–5.3)
POTASSIUM SERPL-SCNC: 3.9 MMOL/L — SIGNIFICANT CHANGE UP (ref 3.5–5.3)
RBC # BLD: 4.93 M/UL — SIGNIFICANT CHANGE UP (ref 4.2–5.8)
RBC # FLD: 12.7 % — SIGNIFICANT CHANGE UP (ref 10.3–14.5)
SODIUM SERPL-SCNC: 136 MMOL/L — SIGNIFICANT CHANGE UP (ref 135–145)
WBC # BLD: 9.31 K/UL — SIGNIFICANT CHANGE UP (ref 3.8–10.5)
WBC # FLD AUTO: 9.31 K/UL — SIGNIFICANT CHANGE UP (ref 3.8–10.5)

## 2019-10-20 RX ORDER — HYDRALAZINE HCL 50 MG
10 TABLET ORAL ONCE
Refills: 0 | Status: COMPLETED | OUTPATIENT
Start: 2019-10-20 | End: 2019-10-20

## 2019-10-20 RX ADMIN — PIPERACILLIN AND TAZOBACTAM 200 GRAM(S): 4; .5 INJECTION, POWDER, LYOPHILIZED, FOR SOLUTION INTRAVENOUS at 04:52

## 2019-10-20 RX ADMIN — PIPERACILLIN AND TAZOBACTAM 200 GRAM(S): 4; .5 INJECTION, POWDER, LYOPHILIZED, FOR SOLUTION INTRAVENOUS at 10:13

## 2019-10-20 NOTE — DISCHARGE NOTE NURSING/CASE MANAGEMENT/SOCIAL WORK - PATIENT PORTAL LINK FT
You can access the FollowMyHealth Patient Portal offered by [facility] by registering at the following website: [email or web address]. By joining [employer]’s FollowMyHealth portal, you will also be able to view your health information using other applications (apps) compatible with our system.

## 2019-10-20 NOTE — DISCHARGE NOTE PROVIDER - NSDCFUADDINST_GEN_ALL_CORE_FT
For any temperatures > 101, worsening of pain, chest pain, shortness of breath, leg pain, nausea or vomiting, bleeding and/or drainage of your incisions, please call the provided number or visit your nearest emergency room.

## 2019-10-20 NOTE — DISCHARGE NOTE PROVIDER - HOSPITAL COURSE
This is a 52 yo male with history of history of alcoholism presented with acute appendicitis. Now s/p laporoscopic appendectomy. The appendix was perforated and gangrenous, requiring multiple days of IV antibiotics. Otherwise, their post-operative course was uncomplicated. At the time of discharge the patient was stable, pain well controlled, tolerating their oral diet and ambulating without issues.

## 2019-10-20 NOTE — DISCHARGE NOTE PROVIDER - CARE PROVIDER_API CALL
Leonidas Mclaughlin (MD)  Surgery  [address], [place] [address]  Phone: [phone]  Fax: [phone]  Follow Up Time: 1 week

## 2019-10-20 NOTE — PROGRESS NOTE ADULT - SUBJECTIVE AND OBJECTIVE BOX
24 hr events:  ON: , 5 hydralazine  10/18: Faustin reported no signs of withdrawl at this point. +F/+BM, switched to maintence fluids.     SUBJECTIVE:  Pt seen and examined by chief resident. Pt is doing well, resting comfortably on bed. Pain controlled. Diet tolerated. Ambulating out of bed. +F/+BM. No nausea or vomiting. No complaints at this time.      Vital Signs Last 24 Hrs  T(C): 36.8 (19 Oct 2019 06:00), Max: 37.2 (18 Oct 2019 17:59)  T(F): 98.2 (19 Oct 2019 06:00), Max: 99 (18 Oct 2019 17:59)  HR: 75 (19 Oct 2019 04:30) (75 - 83)  BP: 157/85 (19 Oct 2019 04:30) (157/85 - 169/95)  BP(mean): 120 (18 Oct 2019 20:30) (120 - 124)  RR: 16 (19 Oct 2019 04:30) (14 - 19)  SpO2: 94% (19 Oct 2019 04:30) (94% - 98%)    Physical Exam:  General: NAD  Pulmonary: Nonlabored breathing, no respiratory distress  Abdominal: soft, NT/ND, HARITHA with min serous fluid  Extremities: WWP,   Neuro: A/O x3,       I&O's Summary    18 Oct 2019 07:01  -  19 Oct 2019 07:00  --------------------------------------------------------  IN: 0 mL / OUT: 1910 mL / NET: -1910 mL        LABS:                        14.1   13.88 )-----------( 259      ( 18 Oct 2019 08:11 )             44.6     10-18    134<L>  |  99  |  14  ----------------------------<  128<H>  3.9   |  25  |  0.86    Ca    8.7      18 Oct 2019 08:11  Phos  2.5     10-18  Mg     2.0     10-18
24 hr events:  ON: 1mg ativan followed by 10 hydral for  flushed face, mild agitation. T rectal 2am 100.8->tylenol. HDS w/ exam wnl.   10/17: Passed TOV. HECTORWA protocol ordered. completed banana bag started on LR. Adv to CLD. tolerated, +F/-BM. Seen by Tory.     SUBJECTIVE:  Pt seen and examined by chief resident. Pt is doing well, feeling better, resting comfortably on bed. Pain controlled. Clear liquid diet tolerated. Ambulating out of bed.  No complaints at this time.      Vital Signs Last 24 Hrs  T(C): 36.8 (18 Oct 2019 05:40), Max: 38.2 (18 Oct 2019 01:32)  T(F): 98.3 (18 Oct 2019 05:40), Max: 100.8 (18 Oct 2019 01:32)  HR: 86 (18 Oct 2019 04:26) (78 - 100)  BP: 151/84 (18 Oct 2019 04:26) (115/59 - 169/92)  BP(mean): 111 (18 Oct 2019 04:26) (83 - 125)  RR: 17 (18 Oct 2019 04:26) (17 - 20)  SpO2: 93% (18 Oct 2019 04:26) (93% - 98%)    Physical Exam:  General: NAD  Pulmonary: Nonlabored breathing, no respiratory distress  Cardiovascular: NSR  Abdominal: soft, mild distention, nontender, HARITHA in place with serous output, incisions clean dry and intact.   Extremities: warm, well perfused. SCDS in place    Lines/drains/tubes:  HARITHA     I&O's Summary    17 Oct 2019 07:01  -  18 Oct 2019 07:00  --------------------------------------------------------  IN: 2355 mL / OUT: 1160 mL / NET: 1195 mL    18 Oct 2019 07:01  -  18 Oct 2019 07:50  --------------------------------------------------------  IN: 0 mL / OUT: 100 mL / NET: -100 mL        LABS:                        13.2   15.76 )-----------( 240      ( 17 Oct 2019 11:02 )             41.7     10-17    135  |  103  |  12  ----------------------------<  199<H>  4.5   |  22  |  0.76    Ca    8.6      17 Oct 2019 11:02  Phos  2.1     10-17  Mg     1.7     10-17    TPro  8.5<H>  /  Alb  4.7  /  TBili  1.1  /  DBili  x   /  AST  29  /  ALT  26  /  AlkPhos  61  10-16    PT/INR - ( 16 Oct 2019 19:08 )   PT: 12.8 sec;   INR: 1.13          PTT - ( 16 Oct 2019 19:08 )  PTT:21.4 sec  Urinalysis Basic - ( 16 Oct 2019 17:48 )    Color: Yellow / Appearance: Clear / SG: >=1.030 / pH: x  Gluc: x / Ketone: >=80 mg/dL  / Bili: Moderate / Urobili: 0.2 E.U./dL   Blood: x / Protein: NEGATIVE mg/dL / Nitrite: NEGATIVE   Leuk Esterase: NEGATIVE / RBC: x / WBC x   Sq Epi: x / Non Sq Epi: x / Bacteria: x    LIVER FUNCTIONS - ( 16 Oct 2019 15:47 )  Alb: 4.7 g/dL / Pro: 8.5 g/dL / ALK PHOS: 61 U/L / ALT: 26 U/L / AST: 29 U/L / GGT: x
POST-OPERATIVE NOTE    Procedure: laparoscopic appendectomy     Diagnosis/Indication: acute appendicitis    Surgeon: Arnoldo    S: Pt has no complaints. Denies Headache, CP, SOB, abdominal pain, calf pain, nausea, vomiting. Pain controlled with medication.     O:  T(C): 37.2 (10-17-19 @ 00:45), Max: 37.2 (10-17-19 @ 00:45)  T(F): 99 (10-17-19 @ 00:45), Max: 99 (10-17-19 @ 00:45)  HR: 86 (10-17-19 @ 02:00) (82 - 92)  BP: 137/79 (10-17-19 @ 02:00) (136/74 - 150/84)  RR: 18 (10-17-19 @ 02:00) (15 - 30)  SpO2: 97% (10-17-19 @ 02:00) (95% - 97%)  Wt(kg): --                        15.8   13.62 )-----------( 304      ( 16 Oct 2019 15:47 )             49.0     10-16    143  |  100  |  11  ----------------------------<  184<H>  4.6   |  24  |  0.86    Ca    9.9      16 Oct 2019 15:47    TPro  8.5<H>  /  Alb  4.7  /  TBili  1.1  /  DBili  x   /  AST  29  /  ALT  26  /  AlkPhos  61  10-16      Gen: NAD, resting comfortably in bed  C/V: NSR  Pulm: Nonlabored breathing, no respiratory distress  Abd: soft, mildly distended, ATTP, incisions c/d/i, HARITHA SS  Extrem: WWP, no calf edema or tenderness, SCDs in place      A/P: 53y Male s/p above procedure  Diet: NPO  IVF: banana bag  Pain/nausea control  DVT ppx: HSQ/SCDs  Dispo plan: pending
STATUS POST:  Laparoscopic appendectomy      POST OPERATIVE DAY #:     Patient seen and examined at bedside. In no acute distress. Denies N/V. Is tolerating diet, having BM and ambulating.     OBJECTIVE:    Vital Signs Last 24 Hrs  T(C): 36.9 (20 Oct 2019 06:00), Max: 37.2 (19 Oct 2019 09:18)  T(F): 98.4 (20 Oct 2019 06:00), Max: 98.9 (19 Oct 2019 09:18)  HR: 80 (19 Oct 2019 21:17) (74 - 86)  BP: 167/94 (19 Oct 2019 21:17) (161/96 - 174/96)  BP(mean): 123 (19 Oct 2019 21:17) (122 - 126)  RR: 15 (19 Oct 2019 21:17) (15 - 221)  SpO2: 95% (19 Oct 2019 21:17) (95% - 97%)    I&O's Summary    19 Oct 2019 07:01  -  20 Oct 2019 07:00  --------------------------------------------------------  IN: 2100 mL / OUT: 605 mL / NET: 1495 mL    20 Oct 2019 07:01  -  20 Oct 2019 08:13  --------------------------------------------------------  IN: 100 mL / OUT: 0 mL / NET: 100 mL        Physical Exam:  General Appearance: Appears well, NAD  HEENT: Grossly symmetric  Chest: Non labored breathing  CV: Pulse regular presently  Abdomen: Soft, nontender, nondistended, dressings clean and dry and intact  Extremities: Grossly symmetric, no swelling, warm.     LABS:                        13.8   9.31  )-----------( 312      ( 20 Oct 2019 06:43 )             43.4     10-20    136  |  103  |  11  ----------------------------<  114<H>  3.9   |  22  |  0.87    Ca    8.8      20 Oct 2019 06:43  Phos  3.8     10-20  Mg     2.0     10-20            RADIOLOGY & ADDITIONAL STUDIES:
STATUS POST:  Laparoscopic appendectomy    Patient seen and examined at bedside. In no acute distress. Denies N/V. Pain well controlled. Currently NPO.     OBJECTIVE:    Vital Signs Last 24 Hrs  T(C): 36.7 (17 Oct 2019 05:26), Max: 38.3 (16 Oct 2019 21:09)  T(F): 98.1 (17 Oct 2019 05:26), Max: 101 (16 Oct 2019 21:09)  HR: 76 (17 Oct 2019 03:30) (18 - 97)  BP: 133/63 (17 Oct 2019 03:30) (109/71 - 169/82)  BP(mean): 90 (17 Oct 2019 03:30) (78 - 98)  RR: 19 (17 Oct 2019 03:30) (15 - 30)  SpO2: 98% (17 Oct 2019 03:30) (95% - 98%)    I&O's Summary    16 Oct 2019 07:01  -  17 Oct 2019 07:00  --------------------------------------------------------  IN: 875 mL / OUT: 350 mL / NET: 525 mL        Physical Exam:  General Appearance: Appears well, NAD  HEENT: Grossly symmetric  Chest: Non labored breathing  CV: Pulse regular presently  Abdomen: Soft, nontender, nondistended, dressings clean and dry and intact  Extremities: Grossly symmetric, no swelling, warm.     LABS:                        15.8   13.62 )-----------( 304      ( 16 Oct 2019 15:47 )             49.0     10-16    143  |  100  |  11  ----------------------------<  184<H>  4.6   |  24  |  0.86    Ca    9.9      16 Oct 2019 15:47    TPro  8.5<H>  /  Alb  4.7  /  TBili  1.1  /  DBili  x   /  AST  29  /  ALT  26  /  AlkPhos  61  10-16    PT/INR - ( 16 Oct 2019 19:08 )   PT: 12.8 sec;   INR: 1.13          PTT - ( 16 Oct 2019 19:08 )  PTT:21.4 sec  Urinalysis Basic - ( 16 Oct 2019 17:48 )    Color: Yellow / Appearance: Clear / SG: >=1.030 / pH: x  Gluc: x / Ketone: >=80 mg/dL  / Bili: Moderate / Urobili: 0.2 E.U./dL   Blood: x / Protein: NEGATIVE mg/dL / Nitrite: NEGATIVE   Leuk Esterase: NEGATIVE / RBC: x / WBC x   Sq Epi: x / Non Sq Epi: x / Bacteria: x        RADIOLOGY & ADDITIONAL STUDIES:
INTERVAL HPI/OVERNIGHT EVENTS:  Interim reviewed; Question of ETOH withdrawal last night; Patient denies this; He states he is frustrated with not getting sleep and being awake through out the night; This AM there is no sign of withdrawal; No tremors; Very frrustrated      MEDICATIONS  (STANDING):  heparin  Injectable 5000 Unit(s) SubCutaneous every 8 hours  influenza   Vaccine 0.5 milliLiter(s) IntraMuscular once  lactated ringers. 1000 milliLiter(s) (110 mL/Hr) IV Continuous <Continuous>  piperacillin/tazobactam IVPB.. 3.375 Gram(s) IV Intermittent every 6 hours  potassium chloride   Powder 20 milliEquivalent(s) Oral once    MEDICATIONS  (PRN):  HYDROmorphone  Injectable 0.5 milliGRAM(s) IV Push every 4 hours PRN Moderate Pain (4 - 6)  HYDROmorphone  Injectable 1 milliGRAM(s) IV Push every 4 hours PRN Severe Pain (7 - 10)  melatonin 5 milliGRAM(s) Oral at bedtime PRN Insomnia  ondansetron Injectable 4 milliGRAM(s) IV Push every 6 hours PRN Nausea      Allergies    No Known Allergies    Intolerances        Vital Signs Last 24 Hrs  T(C): 36.7 (18 Oct 2019 10:00), Max: 38.2 (18 Oct 2019 01:32)  T(F): 98.1 (18 Oct 2019 10:00), Max: 100.8 (18 Oct 2019 01:32)  HR: 86 (18 Oct 2019 04:26) (80 - 100)  BP: 151/84 (18 Oct 2019 04:26) (115/59 - 169/92)  BP(mean): 111 (18 Oct 2019 04:26) (83 - 125)  RR: 17 (18 Oct 2019 04:26) (17 - 20)  SpO2: 93% (18 Oct 2019 04:26) (93% - 95%)          Constitutional: Awake    Eyes: LOUISE    ENMT: Negative    Neck: Supple    Back:  no tenderness     Respiratory:  clear    Cardiovascular: S1 S2    Gastrointestinal:  soft slight distension; no pain    Genitourinary:    Extremities: no edema    Vascular:    Neurological:    Skin:    Lymph Nodes:            10-17 @ 07:01  -  10-18 @ 07:00  --------------------------------------------------------  IN: 2355 mL / OUT: 1160 mL / NET: 1195 mL    10-18 @ 07:01  -  10-18 @ 10:25  --------------------------------------------------------  IN: 0 mL / OUT: 100 mL / NET: -100 mL      LABS:                        14.1   13.88 )-----------( 259      ( 18 Oct 2019 08:11 )             44.6     10-18    134<L>  |  99  |  14  ----------------------------<  128<H>  3.9   |  25  |  0.86    Ca    8.7      18 Oct 2019 08:11  Phos  2.5     10-18  Mg     2.0     10-18    TPro  8.5<H>  /  Alb  4.7  /  TBili  1.1  /  DBili  x   /  AST  29  /  ALT  26  /  AlkPhos  61  10-16    PT/INR - ( 16 Oct 2019 19:08 )   PT: 12.8 sec;   INR: 1.13          PTT - ( 16 Oct 2019 19:08 )  PTT:21.4 sec  Urinalysis Basic - ( 16 Oct 2019 17:48 )    Color: Yellow / Appearance: Clear / SG: >=1.030 / pH: x  Gluc: x / Ketone: >=80 mg/dL  / Bili: Moderate / Urobili: 0.2 E.U./dL   Blood: x / Protein: NEGATIVE mg/dL / Nitrite: NEGATIVE   Leuk Esterase: NEGATIVE / RBC: x / WBC x   Sq Epi: x / Non Sq Epi: x / Bacteria: x        RADIOLOGY & ADDITIONAL TESTS:

## 2019-10-20 NOTE — PROGRESS NOTE ADULT - REASON FOR ADMISSION
Acute appendicitis

## 2019-10-20 NOTE — DISCHARGE NOTE PROVIDER - NSDCACTIVITY_GEN_ALL_CORE
Driving allowed/Stairs allowed/Return to Work/School allowed/Walking - Outdoors allowed/Showering allowed/Walking - Indoors allowed/Sex allowed/No heavy lifting/straining

## 2019-10-21 PROBLEM — Z00.00 ENCOUNTER FOR PREVENTIVE HEALTH EXAMINATION: Status: ACTIVE | Noted: 2019-10-21

## 2019-10-21 LAB — SURGICAL PATHOLOGY STUDY: SIGNIFICANT CHANGE UP

## 2019-10-23 DIAGNOSIS — A41.9 SEPSIS, UNSPECIFIED ORGANISM: ICD-10-CM

## 2019-10-23 DIAGNOSIS — N40.0 BENIGN PROSTATIC HYPERPLASIA WITHOUT LOWER URINARY TRACT SYMPTOMS: ICD-10-CM

## 2019-10-23 DIAGNOSIS — R10.9 UNSPECIFIED ABDOMINAL PAIN: ICD-10-CM

## 2019-10-23 DIAGNOSIS — F17.210 NICOTINE DEPENDENCE, CIGARETTES, UNCOMPLICATED: ICD-10-CM

## 2019-10-23 DIAGNOSIS — K35.32 ACUTE APPENDICITIS WITH PERFORATION, LOCALIZED PERITONITIS, AND GANGRENE, WITHOUT ABSCESS: ICD-10-CM

## 2019-10-23 DIAGNOSIS — F10.20 ALCOHOL DEPENDENCE, UNCOMPLICATED: ICD-10-CM

## 2019-10-31 PROCEDURE — 71045 X-RAY EXAM CHEST 1 VIEW: CPT

## 2019-10-31 PROCEDURE — 80048 BASIC METABOLIC PNL TOTAL CA: CPT

## 2019-10-31 PROCEDURE — C1889: CPT

## 2019-10-31 PROCEDURE — 85610 PROTHROMBIN TIME: CPT

## 2019-10-31 PROCEDURE — 96375 TX/PRO/DX INJ NEW DRUG ADDON: CPT

## 2019-10-31 PROCEDURE — 36415 COLL VENOUS BLD VENIPUNCTURE: CPT

## 2019-10-31 PROCEDURE — 88304 TISSUE EXAM BY PATHOLOGIST: CPT

## 2019-10-31 PROCEDURE — 81003 URINALYSIS AUTO W/O SCOPE: CPT

## 2019-10-31 PROCEDURE — 85025 COMPLETE CBC W/AUTO DIFF WBC: CPT

## 2019-10-31 PROCEDURE — 96361 HYDRATE IV INFUSION ADD-ON: CPT

## 2019-10-31 PROCEDURE — 74177 CT ABD & PELVIS W/CONTRAST: CPT

## 2019-10-31 PROCEDURE — 86850 RBC ANTIBODY SCREEN: CPT

## 2019-10-31 PROCEDURE — 99285 EMERGENCY DEPT VISIT HI MDM: CPT | Mod: 25

## 2019-10-31 PROCEDURE — 86901 BLOOD TYPING SEROLOGIC RH(D): CPT

## 2019-10-31 PROCEDURE — 85027 COMPLETE CBC AUTOMATED: CPT

## 2019-10-31 PROCEDURE — 85730 THROMBOPLASTIN TIME PARTIAL: CPT

## 2019-10-31 PROCEDURE — 93005 ELECTROCARDIOGRAM TRACING: CPT

## 2019-10-31 PROCEDURE — 86900 BLOOD TYPING SEROLOGIC ABO: CPT

## 2019-10-31 PROCEDURE — 80053 COMPREHEN METABOLIC PANEL: CPT

## 2019-10-31 PROCEDURE — 96374 THER/PROPH/DIAG INJ IV PUSH: CPT | Mod: XU

## 2019-10-31 PROCEDURE — 84100 ASSAY OF PHOSPHORUS: CPT

## 2019-10-31 PROCEDURE — 83690 ASSAY OF LIPASE: CPT

## 2019-10-31 PROCEDURE — 83735 ASSAY OF MAGNESIUM: CPT

## 2019-12-16 ENCOUNTER — APPOINTMENT (OUTPATIENT)
Dept: SURGERY | Facility: CLINIC | Age: 54
End: 2019-12-16
Payer: COMMERCIAL

## 2019-12-16 VITALS
BODY MASS INDEX: 26.82 KG/M2 | HEART RATE: 71 BPM | DIASTOLIC BLOOD PRESSURE: 85 MMHG | HEIGHT: 72 IN | SYSTOLIC BLOOD PRESSURE: 153 MMHG | TEMPERATURE: 97.4 F | OXYGEN SATURATION: 98 % | WEIGHT: 198 LBS

## 2019-12-16 DIAGNOSIS — Z86.19 PERSONAL HISTORY OF OTHER INFECTIOUS AND PARASITIC DISEASES: ICD-10-CM

## 2019-12-16 DIAGNOSIS — Z78.9 OTHER SPECIFIED HEALTH STATUS: ICD-10-CM

## 2019-12-16 DIAGNOSIS — Z82.49 FAMILY HISTORY OF ISCHEMIC HEART DISEASE AND OTHER DISEASES OF THE CIRCULATORY SYSTEM: ICD-10-CM

## 2019-12-16 DIAGNOSIS — Z84.1 FAMILY HISTORY OF DISORDERS OF KIDNEY AND URETER: ICD-10-CM

## 2019-12-16 DIAGNOSIS — Z83.3 FAMILY HISTORY OF DIABETES MELLITUS: ICD-10-CM

## 2019-12-16 DIAGNOSIS — K37 UNSPECIFIED APPENDICITIS: ICD-10-CM

## 2019-12-16 PROCEDURE — 99024 POSTOP FOLLOW-UP VISIT: CPT

## 2019-12-20 PROBLEM — Z84.1 FAMILY HISTORY OF KIDNEY DISEASE: Status: ACTIVE | Noted: 2019-12-16

## 2019-12-20 PROBLEM — K37 APPENDICITIS: Status: ACTIVE | Noted: 2019-12-16

## 2019-12-20 PROBLEM — Z83.3 FAMILY HISTORY OF DIABETES MELLITUS: Status: ACTIVE | Noted: 2019-12-16

## 2019-12-20 PROBLEM — Z78.9 NON-SMOKER: Status: ACTIVE | Noted: 2019-12-16

## 2019-12-20 PROBLEM — Z82.49 FAMILY HISTORY OF CARDIAC DISORDER: Status: ACTIVE | Noted: 2019-12-16

## 2019-12-20 PROBLEM — Z86.19 HISTORY OF VARICELLA: Status: RESOLVED | Noted: 2019-12-16 | Resolved: 2019-12-20

## 2019-12-20 NOTE — ASSESSMENT
[FreeTextEntry1] : 54 Year old male. s/p appendectomy doing well. pathology benign. No need for further treatment. Counseled on ETOH. We discussed natural scar maturation and gradual return to normal activity. I answered all questions.

## 2019-12-20 NOTE — HISTORY OF PRESENT ILLNESS
[de-identified] : 54 Year old male. Admitted to [facility] with acute appendicitis. c/b some alcohol withdrawal. Doing well now. No fevers, chills or shortness of breath. eating and drinking without issue. Back to work.

## 2019-12-20 NOTE — PHYSICAL EXAM
[JVD] : no jugular venous distention  [Normal Breath Sounds] : Normal breath sounds [Abdomen Tenderness] : ~T ~M No abdominal tenderness [Abdominal Masses] : No abdominal masses [Normal Heart Sounds] : normal heart sounds [Tender] : nontender [Oriented to Person] : oriented to person [Enlarged] : not enlarged [Alert] : alert [Oriented to Time] : oriented to time [Oriented to Place] : oriented to place [de-identified] : LIZBET. Manoj. Appropriate. Comfortable. [Calm] : calm [de-identified] : Pupils equal. No Scleral Icterus. NCAT. [de-identified] : Abdomen is soft, non tender and non distended. Do not appreciate any overt mass. No overt hernia detected. Incisions are healing well. \par  [de-identified] : Supple. Trachea midline. No overt lymphadenopathy. No JVD

## 2021-01-20 NOTE — ED ADULT TRIAGE NOTE - NS ED TRIAGE HISTORIAN
OT: Attempted OT session. RN reports pt is not alert enough to participate and team to have a goals of care discussion with family later today. RN recommended to hold therapy today and check back tomorrow.   Patient

## 2022-05-02 NOTE — PATIENT PROFILE ADULT - SAFE PLACE TO LIVE
[de-identified] : This 43-year-old pleasant healthy lady is seen for evaluation of back pain.  She has had a number of months of back pain though over the past few weeks it has significantly worsened.  This patient has been treated by another physician with gabapentin diclofenac Flexeril and recently a 4-day course of prednisone.  He also had 4 weeks of physical therapy in the past.  Past history is otherwise unremarkable no

## 2022-11-07 ENCOUNTER — TRANSCRIPTION ENCOUNTER (OUTPATIENT)
Age: 57
End: 2022-11-07

## 2022-11-07 NOTE — ASU PATIENT PROFILE, ADULT - NSICDXPASTSURGICALHX_GEN_ALL_CORE_FT
PAST SURGICAL HISTORY:  History of bunionectomy of right great toe     S/P left knee surgery     Status post appendectomy

## 2022-11-07 NOTE — ASU PATIENT PROFILE, ADULT - FALL HARM RISK - UNIVERSAL INTERVENTIONS
Bed in lowest position, wheels locked, appropriate side rails in place/Call bell, personal items and telephone in reach/Instruct patient to call for assistance before getting out of bed or chair/Non-slip footwear when patient is out of bed/[place] to call system/Physically safe environment - no spills, clutter or unnecessary equipment/Purposeful Proactive Rounding/Room/bathroom lighting operational, light cord in reach

## 2022-11-07 NOTE — ASU PATIENT PROFILE, ADULT - NSICDXPASTMEDICALHX_GEN_ALL_CORE_FT
PAST MEDICAL HISTORY:  Hypertension Controlled by diet and exercize     PAST MEDICAL HISTORY:  Hypertension Controlled by diet and exercise    Rosacea

## 2022-11-07 NOTE — ASU PATIENT PROFILE, ADULT - FALL HARM RISK - FALL HARM RISK
[facility]  [address] [address] [address]               Jared Michael Cowden   2017  8:00 PM   ED    Description:  Male : 1991   Department:  [facility]           Your Care was Coordinated By:     Provider Role From To    Matt Birch MD Attending Provider 17 --      Reason for Visit     Altered Mental Status           Diagnoses this Visit        Comments    Alcohol intoxication, with unspecified complication    -  Primary     Altered level of consciousness         Fall           ED Disposition     None           To Do List           Follow-up Information     Follow up with Lorenzo Morley MD In 3 days.    Specialty:  Family Medicine    Contact information:    [address]  [place] [address]  [phone]          Follow up with [facility].    Specialty:  Emergency Medicine    Why:  If symptoms worsen, Or worsening condition or any other major concern    Contact information:    [address] [address] [address] [phone]      [facility]     [facility] -  Assistance  Registered nurses in the [facility] provide clinical advisement, health education, appointment booking, and other advisory services.  Call for this free service at [phone].             Medications           Message regarding Medications     Verify the changes and/or additions to your medication regime listed below are the same as discussed with your clinician today.  If any of these changes or additions are incorrect, please notify your healthcare provider.        These medications were administered today        Dose Freq    sodium chloride 0.9% bolus 1,000 mL 1,000 mL ED 1 Time    Sig: Inject 1,000 mLs into the vein ED 1 Time.    Class: Normal    Route: Intravenous      STOP taking these medications     gabapentin (NEURONTIN) 100 MG capsule Take 1 capsule (100  mg total) by mouth every evening.    naproxen (NAPROSYN) 500 MG tablet Take 1 tablet (500 mg total) by mouth 2 (two) times daily with meals.           Verify that the below list of medications is an accurate representation of the medications you are currently taking.  If none reported, the list may be blank. If incorrect, please contact your healthcare provider. Carry this list with you in case of emergency.           Current Medications            Clinical Reference Information           Your Vitals Were     BP                   121/77 (BP Location: Left arm, Patient Position: Lying, BP Method: Automatic)           Allergies as of 2/26/2017     No Known Allergies      Immunizations Administered on Date of Encounter - 2/26/2017     None      ED Micro, Lab, POCT     Start Ordered       Status Ordering Provider    02/26/17 2006 02/26/17 2007  Comprehensive metabolic panel  STAT      Final result     02/26/17 2006 02/26/17 2007  Drug screen panel, emergency  STAT      Final result     02/26/17 2006 02/26/17 2007  Ethanol  Once      Final result     02/26/17 2006 02/26/17 2007  Urinalysis  STAT      Final result     02/26/17 2005 02/26/17 2007  APTT  STAT      Final result     02/26/17 2005 02/26/17 2007  Protime-INR  STAT      Final result     02/26/17 2005 02/26/17 2007  CBC auto differential  STAT      Final result       ED Imaging Orders     Start Ordered       Status Ordering Provider    02/26/17 2010 02/26/17 2009  X-Ray Hip 2 View Left  1 time imaging      Final result     02/26/17 2009 02/26/17 2009  X-Ray Elbow Complete Bilateral  1 time imaging      Final result     02/26/17 2008 02/26/17 2007  X-Ray Chest 1 View  1 time imaging      Final result     02/26/17 2007 02/26/17 2007  CT Head Without Contrast  1 time imaging      Final result         Discharge Instructions         Alcohol Intoxication  Alcohol intoxication occurs when you drink alcohol faster than your liver can remove it from your system. The  "following facts are important to remember:  · It can take 10 minutes or more to start to feel the effects of a drink, so you can easily get more intoxicated than you intended.  · One drink may be more than 1 serving of alcohol. Depending on the drink, it can be 2 to 4 servings.  · It takes about an hour for your body to metabolize (clear) 1 serving. If you have more than 1 drink, it can take a couple of hours or more.  · Many things affect how drinks will affect you, including whether youve eaten, how fast you drink, your size, how much you normally drink (or not), medicines you take, chronic diseases you have, and gender.  Signs and symptoms of alcohol poisoning  The following are signs and symptoms of alcohol poisoning:  Mild impairment  · Reduced inhibitions  · Slurred speech  · Drowsiness  · Decreased fine motor skills  Moderate impairment  · Erratic behavior, aggression, depression  · Impaired judgment  · Confusion  · Concentration difficulties  · Coordination problems  Severe impairment  · Vomiting  · Seizures  · Unconsciousness  · Cold, clammy  · Slow or irregular breathing  · Hypothermia (low body temperature)  · Coma  Health effects  Alcohol abuse causes health problems. Sometimes this can happen after only drinking a little." There is no set number of drinks or amount of alcohol that defines too much. The more you drink at one time, and the more frequently you drink determine both the short-term and long-term health effects. It affects all parts of your body and your health, including your:  · Brain. Alcohol is a central nervous system depressant. It can damage parts of the brain that affect your balance, memory, thinking, and emotions. It can cause memory loss, blackouts, depression, agitation, sleep cycle changes, and seizures. These changes may or may not be reversible.  · Heart and vascular system. Alcohol affects multiple areas. It can damage heart muscle causing cardiomyopathy, which is a weakening " and stretching of the heart muscle. This can lead to trouble breathing, an irregular heartbeat, atrial fibrillation, leg swelling, and heart failure. It makes the blood vessels stiffen causing hypertension (high blood pressure). All of these problems increase your risk of having heart attacks or strokes.  · Liver. Alcohol causes fat to build up in the liver, affecting its normal function. This increases the risk for hepatitis, leading to abdominal pain, appetite loss, jaundice, bleeding problems, liver fibrosis, and cirrhosis. This in turn can affect your ability to fight off infections, and can cause diabetes. The liver changes prevent it from removing toxins in your blood that can cause encephalopathy. Signs of this are confusion, altered level of consciousness, personality changes, memory loss, seizures, coma, and death.  · Pancreas. Alcohol can cause inflammation of the pancreas, or pancreatitis. This can cause pain in your abdomen, fever, and diabetes.  · Immune system. Alcohol weakens your immune system in a number of ways. It suppresses your immune system making it harder to fight off infections and colds. You will also have a higher risk of certain infections like pneumonia and tuberculosis.  · Cancer risk. Alcohol raises your risk of cancer of the mouth, esophagus, pharynx, larynx, liver, and breast.  · Sexual function. Alcohol abuse can also lead to sexual problems.  Alcohol use during pregnancy may cause permanent damage to the growing baby.  Home care  The following guidelines will help you care for yourself at home:  · Don't drink any more alcohol.  · Don't drive until all effects of the alcohol have worn off.  · Don't operate machinery that can cause injuries.  · Get lots of rest over the next few days. Drink plenty of water and other non-alcoholic liquids. Try to eat regular meals.  · If you have been drinking heavily on a daily basis, you may go through alcohol withdrawal. The usual symptoms last 3  to 4 days and may include nervousness, shakiness, nausea, sweating, sleeplessness, and can even cause seizures and a serious withdrawal symptom called delirium tremens, or DTs. During this time, it is best that you stay with family or friends who can help and support you. You can also admit yourself to a residential detox program. If your symptoms are severe (seizures, severe shakiness, confusion), contact your doctor or call an ambulance for help (see below).   Follow-up care  If alcohol is a problem in your life, these are some organizations that can help you:  · Alcoholics Anonymous offers support through a self-help fellowship. There are no dues or fees. See the Yellow Pages and call for time and place of meetings. Find AA online at www.aa.org.  · [employer] offers support to families of alcohol users. Contact 382-874-9506, or online at www.al-anon.org.  · National [place] on Alcoholism and Drug Dependence can be reached at 995-618-6182, or online at www.ncadd.org.  · There are also inpatient and residential alcohol detox programs. Check the Internet or phonebook Yellow Pages under Drug Abuse and Treatment Centers.  Call 911  Call 911 if any of these occur:  · Trouble breathing or slow irregular breathing  · Chest pain  · Sudden weakness on one side of your body or sudden trouble speaking  · Heavy bleeding or vomiting blood  · Very drowsy or trouble awakening  · Fainting or loss of consciousness  · Rapid heart rate  · Seizure  When to seek medical advice  Call your healthcare provider right away if any of these occur:  · Severe shakiness   · Fever over 100.4º F (38.0º C)  · Confusion or hallucinations (seeing, hearing, or feeling things that are not there)  · Pain in your upper abdomen that gets worse  · Repeated vomiting  Date Last Reviewed: 6/1/2016  © 2521-2268 [employer]. [address] [place] [address], [place] [address]. All rights reserved. This information is not intended as a substitute for  professional medical care. Always follow your healthcare professional's instructions.          Uncertain Causes of Fall  You have had a fall today. But the cause of your fall is not certain. Falls can occur due to slipping, tripping or losing your balance. A fall can also occur from a fainting spell or seizure.  While a fall can happen for a simple reason (tripping over something), falls in elderly people are often caused by a combination of things:  · Age-related decline in function with worsening balance, stability, vision, and muscle strength  · Chronic illness such as heart arrhythmias, heart valve disease, vascular disease, COPD, diabetes, strokes, arthritis  · Effects or side effects of medicines  · Dehydration.  · Environmental hazards such as uneven or slippery ground, unfamiliar place, obstacles, uneven surfaces, or slippery ground  · Situational factors (related to the activity being done, e.g., rushing to the bathroom)  Because the cause of your fall today is not certain, it is possible that a fainting spell or seizure was the cause. This means that it could happen again, without warning. If you fall again, without a cause, then you should return to this facility promptly to have further tests. Otherwise, follow up with your doctor as explained below.  It is normal to feel sore and tight in your muscles and back the next day, and not just the muscles you initially injured. Remember, all the parts of your body are connected, so while initially one area hurts, the next day another may hurt. Also, when you injure yourself, it causes inflammation, which then causes the muscles to tighten up and hurt more. After the initial worsening, it should gradually improve over the next few days. However, more severe pain should be reported.  Even without a definite head injury, you can still get a concussion. Concussions and even bleeding can still occur, especially if you have had a recent injury or take blood thinner  medicine. It is not unusual to have a mild headache and feel tired and even nauseous or dizzy.  Home care  · Rest today and resume your normal activities as soon as you are feeling back to normal. It is best to remain with someone who can check on you for the next 24 hours to watch for another episode of falling.  · If you were injured during the fall, follow the advice from your doctor regarding care of your injury.  ·  If you become light-headed or dizzy, lie down immediately or sit and lean forward with your head down.  · As a precaution, do not drive a car or operate dangerous equipment, do not take a bath alone (use a shower instead) and do not swim alone until you see your doctor. A condition causing fainting or seizures must be ruled out before resuming these activities.  · You may use acetaminophen or ibuprofen to control pain, unless another pain medicine was prescribed. If you have chronic liver or kidney disease or ever had a stomach ulcer or gastrointestinal bleeding, talk with your doctor before using these medicines.  · Keep your appointments for any further testing that may have been scheduled for you.  Follow-up care  Follow up with your healthcare provider, or as advised.  If X-rays or CT scan were done, you will be notified if there is a change in the reading, especially if it affects treatment.  Call 911  Call 911 if any of these occur:  · Trouble breathing  · Confused or difficulty arousing  · Fainting or loss of consciousness  · Rapid or very slow heart rate  · Seizure  · Difficulty with speech or vision, weakness of an arm or leg  · Difficulty walking or talking, loss of balance, numbness or weakness in one side of your body, facial droop  When to seek medical advice  Call your healthcare provider right away if any of these occur:  · Another unexplained fall  · Dizziness  · Severe headache  · Nausea and vomiting  · Blood in vomit, stools (black or red color)  Date Last Reviewed: 11/5/2015  ©  8730-9136 [employer]. [address] [place] [address], [place] [address]. All rights reserved. This information is not intended as a substitute for professional medical care. Always follow your healthcare professional's instructions.          [facility] Sign-Up     Activating your [facility] account is as easy as 1-2-3!     1) Visit [email or web address], select Sign Up Now, enter this activation code and your date of birth, then select Next.  [email or web address]  Expires: 4/12/2017  9:30 PM      2) Create a username and password to use when you visit [facility] in the future and select a security question in case you lose your password and select Next.    3) Enter your e-mail address and click Sign Up!    Additional Information  If you have questions, please e-mail [email or web address] or call [phone] to talk to our [facility] staff. Remember, [facility] is NOT to be used for urgent needs. For medical emergencies, dial 911.         Smoking Cessation     If you would like to quit smoking:   You may be eligible for free services if you are a [place] resident and started smoking cigarettes before September 1, 1988.  Call the Smoking Cessation Trust ([facility]) toll free at ([phone] or [phone].   Call 1-250-QUIT-NOW if you do not meet the above criteria.             [facility] complies with applicable Federal civil rights laws and does not discriminate on the basis of race, color, national origin, age, disability, or sex.        Language Assistance Services     ATTENTION: Language assistance services are available, free of charge. Please call [phone].      ATENCIÓN: Si habla español, tiene a elias disposición servicios gratuitos de asistencia lingüística. Llame al [phone].     CHÚ Ý: N?u b?n nói Ti?ng Vi?t, có các d?ch v? h? tr? ngôn ng? mi?n phí dành cho b?n. G?i s? [phone].         No indicators present

## 2022-11-08 ENCOUNTER — OUTPATIENT (OUTPATIENT)
Dept: OUTPATIENT SERVICES | Facility: HOSPITAL | Age: 57
LOS: 1 days | Discharge: HOME | End: 2022-11-08

## 2022-11-08 ENCOUNTER — TRANSCRIPTION ENCOUNTER (OUTPATIENT)
Age: 57
End: 2022-11-08

## 2022-11-08 VITALS
TEMPERATURE: 98 F | HEIGHT: 72 IN | OXYGEN SATURATION: 99 % | SYSTOLIC BLOOD PRESSURE: 134 MMHG | RESPIRATION RATE: 16 BRPM | WEIGHT: 196.87 LBS | DIASTOLIC BLOOD PRESSURE: 82 MMHG | HEART RATE: 66 BPM

## 2022-11-08 VITALS
RESPIRATION RATE: 16 BRPM | SYSTOLIC BLOOD PRESSURE: 133 MMHG | HEART RATE: 63 BPM | DIASTOLIC BLOOD PRESSURE: 83 MMHG | OXYGEN SATURATION: 98 % | TEMPERATURE: 97 F

## 2022-11-08 DIAGNOSIS — Z90.49 ACQUIRED ABSENCE OF OTHER SPECIFIED PARTS OF DIGESTIVE TRACT: Chronic | ICD-10-CM

## 2022-11-08 DIAGNOSIS — Z98.890 OTHER SPECIFIED POSTPROCEDURAL STATES: Chronic | ICD-10-CM

## 2022-11-08 DEVICE — KWIRE 1.4X150MM: Type: IMPLANTABLE DEVICE | Status: FUNCTIONAL

## 2022-11-08 DEVICE — IMPLANTABLE DEVICE: Type: IMPLANTABLE DEVICE | Status: FUNCTIONAL

## 2022-11-08 DEVICE — WIRE REDUCTION: Type: IMPLANTABLE DEVICE | Status: FUNCTIONAL

## 2022-11-08 DEVICE — WIRE FIX REDUCTION 5MM: Type: IMPLANTABLE DEVICE | Status: FUNCTIONAL

## 2022-11-08 DEVICE — KWIRE FIXATION PECA 1X150MM: Type: IMPLANTABLE DEVICE | Status: FUNCTIONAL

## 2022-11-08 RX ORDER — SODIUM CHLORIDE 9 MG/ML
1000 INJECTION, SOLUTION INTRAVENOUS
Refills: 0 | Status: DISCONTINUED | OUTPATIENT
Start: 2022-11-08 | End: 2022-11-08

## 2022-11-08 RX ORDER — OXYCODONE HYDROCHLORIDE 5 MG/1
5 TABLET ORAL ONCE
Refills: 0 | Status: DISCONTINUED | OUTPATIENT
Start: 2022-11-08 | End: 2022-11-08

## 2022-11-08 RX ORDER — ACETAMINOPHEN 500 MG
1000 TABLET ORAL ONCE
Refills: 0 | Status: DISCONTINUED | OUTPATIENT
Start: 2022-11-08 | End: 2022-11-08

## 2022-11-08 RX ADMIN — SODIUM CHLORIDE 100 MILLILITER(S): 9 INJECTION, SOLUTION INTRAVENOUS at 14:08

## 2022-11-08 RX ADMIN — SODIUM CHLORIDE 100 MILLILITER(S): 9 INJECTION, SOLUTION INTRAVENOUS at 14:55

## 2022-11-08 NOTE — PRE-ANESTHESIA EVALUATION ADULT - NSANTHOSAYNRD_GEN_A_CORE
No. GAB screening performed.  STOP BANG Legend: 0-2 = LOW Risk; 3-4 = INTERMEDIATE Risk; 5-8 = HIGH Risk

## 2022-11-08 NOTE — ASU DISCHARGE PLAN (ADULT/PEDIATRIC) - ASU DC SPECIAL INSTRUCTIONSFT
You have multiple small incisions to your left foot, these small incisions are closed with stitches. Your stiches will be removed in podiatry clinic in 2-3 weeks. Do NOT remove the bandage on your left foot. Keep the left foot dressing clean, dry and intact; this dressing will be removed by the surgeon (Dr. Radre) during your first post-operative visit in 1 week. Please call the [place] outpatient clinic to schedule your first post-operative visit within 7-10 days following surgery.      At home, elevate your left lower leg to reduce swelling and pain.     You have been prescribed Percocet 5/325mg, to be taken only as needed for management of severe pain. Alternatively you can take OTC anti-inflammatories for pain relief.     Only minimal walking is advised, place more weight on your left heel, use surgical shoe at all times when walking. Do NOT walk directly on your left foot and/or bandage.     If you are unable to make or wish to reschedule your first post-operative clinic appointment, please call the clinic as soon as possible. If you experience nausea, vomiting, chills, fever, pain out of proportion, increased redness/swelling, call the podiatry office and/or come to [facility] Emergency Room.

## 2022-11-08 NOTE — BRIEF OPERATIVE NOTE - COMMENTS
PreOp Local Nerve Block 20cc total 50/50 mixture 1% lidocaine + 0.5% marcaine plain  PostOp Local Nerve Block 20cc 0.5% Marcaine plain   No ankle tourniquet used

## 2022-11-08 NOTE — ASU DISCHARGE PLAN (ADULT/PEDIATRIC) - NS MD DC FALL RISK RISK
For information on Fall & Injury Prevention, visit: [email or web address] OR  [email or web address] OR  https://www.cdc.gov/steadi/patient.html

## 2022-11-08 NOTE — CHART NOTE - NSCHARTNOTEFT_GEN_A_CORE
Patient was consulted and enrolled into the randomized control trial comparing liposomal bupivicaine vs bupivicaine HCL for post operative management of forefoot surgery at [TIME]. All patient's questions and concerns were addressed. Patient was given fully executed copy of the consent form. Patient was randomized to bupivicaine HCL.    Consent signed at 10:07AM on Nov 8, 2022.

## 2022-11-08 NOTE — ASU DISCHARGE PLAN (ADULT/PEDIATRIC) - CARE PROVIDER_API CALL
Nilesh Rader (BETTYM)  Podiatric Medicine and Surgery  [address], [address]  [place] [address]  Phone: [phone]  Fax: [phone]  Established Patient  Follow Up Time: 1 week

## 2022-11-08 NOTE — BRIEF OPERATIVE NOTE - OPERATION/FINDINGS
Left foot minimal incision first metatarsal transverse osteotomy fixated with fully threaded cannulated 56mm and 36mm screws. Proximal phalanx oblique osteotomy fixated with 28mm fully threaded cannulated screw. Surgical incisions closed primarily with 5-0 nylon. Left foot dressed with adaptic, gauze, kerlix, coban. Left foot minimal incision first metatarsal transverse osteotomy fixated with fully threaded cannulated 56mm and 40mm screws. Proximal phalanx oblique osteotomy fixated with 28mm fully threaded cannulated screw. Surgical incisions closed primarily with 5-0 nylon. Left foot dressed with adaptic, gauze, kerlix, coban.

## 2023-03-15 NOTE — ED PROVIDER NOTE - NS ED MD DISPO ISOLATION TYPES
None Azathioprine Pregnancy And Lactation Text: This medication is Pregnancy Category D and isn't considered safe during pregnancy. It is unknown if this medication is excreted in breast milk.

## 2023-08-20 NOTE — H&P ADULT - NSICDXPASTMEDICALHX_GEN_ALL_CORE_FT
History of alcohol abuse presenting for evaluation of headache after using alcohol, unsure how much she drinks though states usually 12-15 beers a day.  Ambulatory here in the ED requesting blanket as well as snacks.  States he needs to sleep. No pertinent past medical history

## 2024-02-20 ENCOUNTER — NON-APPOINTMENT (OUTPATIENT)
Age: 59
End: 2024-02-20

## 2024-02-20 PROBLEM — I10 ESSENTIAL (PRIMARY) HYPERTENSION: Chronic | Status: ACTIVE | Noted: 2022-11-07

## 2024-03-15 ENCOUNTER — NON-APPOINTMENT (OUTPATIENT)
Age: 59
End: 2024-03-15

## 2024-03-15 ENCOUNTER — APPOINTMENT (OUTPATIENT)
Dept: UROLOGY | Facility: CLINIC | Age: 59
End: 2024-03-15
Payer: COMMERCIAL

## 2024-03-15 VITALS
HEART RATE: 65 BPM | OXYGEN SATURATION: 98 % | WEIGHT: 185 LBS | BODY MASS INDEX: 25.09 KG/M2 | TEMPERATURE: 97.9 F | SYSTOLIC BLOOD PRESSURE: 149 MMHG | DIASTOLIC BLOOD PRESSURE: 87 MMHG

## 2024-03-15 DIAGNOSIS — R31.21 ASYMPTOMATIC MICROSCOPIC HEMATURIA: ICD-10-CM

## 2024-03-15 PROCEDURE — 99204 OFFICE O/P NEW MOD 45 MIN: CPT

## 2024-03-15 PROCEDURE — 81003 URINALYSIS AUTO W/O SCOPE: CPT | Mod: QW

## 2024-03-15 PROCEDURE — 51798 US URINE CAPACITY MEASURE: CPT

## 2024-03-15 NOTE — ASSESSMENT
[FreeTextEntry1] : 58-year-old male with new onset of gross, painless hematuria and history of cigarette smoking.  We discussed the potential causes for this problem and the standard evaluation which includes urine culture cytology CT scan and cystoscopy.  I reviewed the indications risks alternatives and chances for success with this approach and he is in agreement.  Tests were scheduled and cystoscopy appointment was made.  Blood was also sent for PSA test. Sonya Christianson MD

## 2024-03-15 NOTE — HISTORY OF PRESENT ILLNESS
[FreeTextEntry1] : 57 YO M seen TODAY 3/15/2024 as NPT for Hematuria. He told me that last month he had an episode of gross hematuria that lasted 1-2 days. This was painless and not associated with any LUTS. He was treated for prostatitis with a course of antibiotics and the hematuria subsided. Never happened before. No history of stone disease, no FH of  cancer. UA negative IPSS 3 EVELINA 0  ANNIKA 25PVR 14 No medication NKMA Smokes 2 cig/day for the last few years only. ETOH 3-4/day No other drugs Surgery for appendix a few years ago.

## 2024-03-15 NOTE — LETTER BODY
[Dear  ___] : Dear  [unfilled], [Consult Letter:] : I had the pleasure of evaluating your patient, [unfilled]. [Please see my note below.] : Please see my note below. [FreeTextEntry3] : Best Regards,   Sonya Christianson MD [Consult Closing:] : Thank you very much for allowing me to participate in the care of this patient.  If you have any questions, please do not hesitate to contact me.

## 2024-03-21 LAB
APPEARANCE: CLEAR
BACTERIA UR CULT: NORMAL
BACTERIA: NEGATIVE /HPF
BILIRUBIN URINE: NEGATIVE
BLOOD URINE: NEGATIVE
CAST: 0 /LPF
COLOR: NORMAL
EPITHELIAL CELLS: 0 /HPF
GLUCOSE QUALITATIVE U: NEGATIVE MG/DL
KETONES URINE: NEGATIVE MG/DL
LEUKOCYTE ESTERASE URINE: ABNORMAL
MICROSCOPIC-UA: NORMAL
NITRITE URINE: NEGATIVE
PH URINE: 7.5
PROTEIN URINE: NEGATIVE MG/DL
PSA SERPL-MCNC: 4.59 NG/ML
RED BLOOD CELLS URINE: 1 /HPF
SPECIFIC GRAVITY URINE: 1.02
URINE CYTOLOGY: NORMAL
UROBILINOGEN URINE: 1 MG/DL
WHITE BLOOD CELLS URINE: 0 /HPF

## 2024-04-10 ENCOUNTER — APPOINTMENT (OUTPATIENT)
Dept: UROLOGY | Facility: CLINIC | Age: 59
End: 2024-04-10
Payer: COMMERCIAL

## 2024-04-10 DIAGNOSIS — R31.0 GROSS HEMATURIA: ICD-10-CM

## 2024-04-10 PROCEDURE — 52000 CYSTOURETHROSCOPY: CPT

## 2024-04-10 PROCEDURE — 81003 URINALYSIS AUTO W/O SCOPE: CPT | Mod: QW

## 2024-04-10 NOTE — ASSESSMENT
[FreeTextEntry1] : Evaluation for gross hematuria negative to date.  CT scan remains pending and we will review the necessity to have that test done now.  Staff will help the patient in this regard.  If that is also negative, then follow-up to retest the urine and 3 months.  As for the mildly elevated PSA, this was repeated today.  Patient declined IRAM today saying that I did the test when I saw him last month,  Although this is not reflected in my note of 3/15/2024.  We will proceed based on the results of this repeat total and free PSA drawn today. Sonya Christianson MD

## 2024-04-10 NOTE — LETTER BODY
[Dear  ___] : Dear  [unfilled], [Courtesy Letter:] : I had the pleasure of seeing your patient, [unfilled], in my office today. [Consult Closing:] : Thank you very much for allowing me to participate in the care of this patient.  If you have any questions, please do not hesitate to contact me. [FreeTextEntry3] : Best Regards,   Sonya Christianson MD

## 2024-04-10 NOTE — HISTORY OF PRESENT ILLNESS
[FreeTextEntry1] : 59 YO M seen 3/15/2024 as NPT for Hematuria. He told me that last month he had an episode of gross hematuria that lasted 1-2 days. This was painless and not associated with any LUTS. He was treated for prostatitis with a course of antibiotics and the hematuria subsided. Never happened before. No history of stone disease, no FH of  cancer. UA negative IPSS 3 EVELINA 0  ANNIKA 25PVR 14 No medication NKMA Smokes 2 cig/day for the last few years only. ETOH 3-4/day No other drugs Surgery for appendix a few years ago. 58-year-old male with new onset of gross, painless hematuria and history of cigarette smoking. We discussed the potential causes for this problem and the standard evaluation which includes urine culture cytology CT scan and cystoscopy. I reviewed the indications risks alternatives and chances for success with this approach and he is in agreement. Tests were scheduled and cystoscopy appointment was made. Blood was also sent for PSA test.  UA micro - trace Leukocyte Esterase no significant RBC. C+S, cytology both negative. PSA 4.59 in view of his recent history of gross hematuria and treatment for prostatitis, we will repeat the PSA as we complete his evaluation for the gross hematuria. CT scan and cystoscopy pending.  Patient seen TODAY 4/10/2024 to repeat PSA and for Cystoscopy with NO. He reports no interval gross hematuria. UA negative CYSTOSCOPY: no intrinsic bladder lesions. moderate BPH. CT scan remains pending.

## 2024-04-16 ENCOUNTER — OUTPATIENT (OUTPATIENT)
Dept: OUTPATIENT SERVICES | Facility: HOSPITAL | Age: 59
LOS: 1 days | End: 2024-04-16
Payer: COMMERCIAL

## 2024-04-16 ENCOUNTER — APPOINTMENT (OUTPATIENT)
Dept: CT IMAGING | Facility: HOSPITAL | Age: 59
End: 2024-04-16
Payer: COMMERCIAL

## 2024-04-16 DIAGNOSIS — Z98.890 OTHER SPECIFIED POSTPROCEDURAL STATES: Chronic | ICD-10-CM

## 2024-04-16 DIAGNOSIS — Z90.49 ACQUIRED ABSENCE OF OTHER SPECIFIED PARTS OF DIGESTIVE TRACT: Chronic | ICD-10-CM

## 2024-04-16 LAB — POCT ISTAT CREATININE: 0.8 MG/DL — SIGNIFICANT CHANGE UP (ref 0.5–1.3)

## 2024-04-16 PROCEDURE — 74178 CT ABD&PLV WO CNTR FLWD CNTR: CPT

## 2024-04-16 PROCEDURE — 82565 ASSAY OF CREATININE: CPT

## 2024-04-16 PROCEDURE — 74178 CT ABD&PLV WO CNTR FLWD CNTR: CPT | Mod: 26

## 2024-04-17 LAB
BILIRUB UR QL STRIP: NEGATIVE
CLARITY UR: CLEAR
COLLECTION METHOD: NORMAL
GLUCOSE UR-MCNC: NEGATIVE
HCG UR QL: 1 EU/DL
HGB UR QL STRIP.AUTO: NEGATIVE
KETONES UR-MCNC: NEGATIVE
LEUKOCYTE ESTERASE UR QL STRIP: NEGATIVE
NITRITE UR QL STRIP: NEGATIVE
PH UR STRIP: 7
PROT UR STRIP-MCNC: NEGATIVE
PSA FREE FLD-MCNC: 13 %
PSA FREE SERPL-MCNC: 0.53 NG/ML
PSA SERPL-MCNC: 4.13 NG/ML
SP GR UR STRIP: 1.01

## 2024-05-13 ENCOUNTER — LABORATORY RESULT (OUTPATIENT)
Age: 59
End: 2024-05-13

## 2024-06-28 ENCOUNTER — NON-APPOINTMENT (OUTPATIENT)
Age: 59
End: 2024-06-28

## 2024-06-28 DIAGNOSIS — R97.20 ELEVATED PROSTATE, SPECIFIC ANTIGEN [PSA]: ICD-10-CM

## 2024-06-28 LAB — PSA SERPL-MCNC: 3.59 NG/ML

## 2024-07-11 ENCOUNTER — APPOINTMENT (OUTPATIENT)
Dept: UROLOGY | Facility: CLINIC | Age: 59
End: 2024-07-11

## 2024-09-06 ENCOUNTER — NON-APPOINTMENT (OUTPATIENT)
Age: 59
End: 2024-09-06

## 2024-10-07 ENCOUNTER — NON-APPOINTMENT (OUTPATIENT)
Age: 59
End: 2024-10-07

## 2024-10-18 ENCOUNTER — LABORATORY RESULT (OUTPATIENT)
Age: 59
End: 2024-10-18

## 2024-10-23 ENCOUNTER — NON-APPOINTMENT (OUTPATIENT)
Age: 59
End: 2024-10-23

## 2024-10-23 DIAGNOSIS — R97.20 ELEVATED PROSTATE, SPECIFIC ANTIGEN [PSA]: ICD-10-CM

## 2025-03-20 ENCOUNTER — NON-APPOINTMENT (OUTPATIENT)
Age: 60
End: 2025-03-20

## 2025-05-27 ENCOUNTER — NON-APPOINTMENT (OUTPATIENT)
Age: 60
End: 2025-05-27

## 2025-07-08 ENCOUNTER — NON-APPOINTMENT (OUTPATIENT)
Age: 60
End: 2025-07-08

## (undated) DEVICE — DRAPE C ARM MINI PACK FOR 6800

## (undated) DEVICE — PACK ORTHO FOOT ANKLE

## (undated) DEVICE — BUR NOVASTEP WEDGE CUT OSTEOTOMY BUNION 3.1MM

## (undated) DEVICE — SUT MONOCRYL 5-0 18" PS-2

## (undated) DEVICE — DRSG KLING 4"

## (undated) DEVICE — SAW BLADE LINVATEC SAGITTAL MIC 9.5X25.5X0.4MM

## (undated) DEVICE — SLV COMPRESSION KNEE MED

## (undated) DEVICE — BUR NOVASTEP CUT MIS BUNION OSTEOTOMY 2.2MM

## (undated) DEVICE — MARKING PEN W RULER

## (undated) DEVICE — BUR NOVASTEP SURGICAL 2 X 8MM

## (undated) DEVICE — POSITIONER FOAM EGG CRATE ULNAR 2PCS (PINK)

## (undated) DEVICE — SUT VICRYL 3-0 18" PS-2 UNDYED

## (undated) DEVICE — BUR BRASSELER OVAL 4 X 8MM

## (undated) DEVICE — DRSG STOCKINETTE UNDERCAST SYNTHETIC 4"

## (undated) DEVICE — GLV 7.5 PROTEXIS (WHITE)

## (undated) DEVICE — WARMING BLANKET UPPER ADULT

## (undated) DEVICE — DRSG ACE BANDAGE 4"

## (undated) DEVICE — TOURNIQUET CUFF 18" DUAL PORT SINGLE BLADDER W PLC  (BLACK)

## (undated) DEVICE — DRILL 3.2MM

## (undated) DEVICE — BUR NOVASTEP CUT AKIN OSTEOTMY 2X12MM

## (undated) DEVICE — SUT NYLON 5-0 18" R-5

## (undated) DEVICE — DRSG COBAN 3" LF NONSTERILE

## (undated) DEVICE — SUT VICRYL 4-0 18" P-2 UNDYED

## (undated) DEVICE — DRSG ADAPTIC 3 X 8"

## (undated) DEVICE — DRILL 2MM